# Patient Record
Sex: MALE | Race: ASIAN | NOT HISPANIC OR LATINO | Employment: STUDENT | URBAN - METROPOLITAN AREA
[De-identification: names, ages, dates, MRNs, and addresses within clinical notes are randomized per-mention and may not be internally consistent; named-entity substitution may affect disease eponyms.]

---
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
## 2019-02-21 ENCOUNTER — OFFICE VISIT (OUTPATIENT)
Dept: FAMILY MEDICINE CLINIC | Facility: CLINIC | Age: 19
End: 2019-02-21
Payer: COMMERCIAL

## 2019-02-21 VITALS
BODY MASS INDEX: 30.72 KG/M2 | DIASTOLIC BLOOD PRESSURE: 70 MMHG | TEMPERATURE: 98.7 F | HEART RATE: 105 BPM | HEIGHT: 72 IN | SYSTOLIC BLOOD PRESSURE: 130 MMHG | WEIGHT: 226.8 LBS

## 2019-02-21 DIAGNOSIS — E11.9 TYPE 2 DIABETES MELLITUS WITHOUT COMPLICATION, WITHOUT LONG-TERM CURRENT USE OF INSULIN (HCC): Primary | ICD-10-CM

## 2019-02-21 DIAGNOSIS — E66.9 OBESITY (BMI 30-39.9): ICD-10-CM

## 2019-02-21 DIAGNOSIS — E66.9 OBESITY (BMI 30.0-34.9): ICD-10-CM

## 2019-02-21 DIAGNOSIS — I10 ESSENTIAL HYPERTENSION: ICD-10-CM

## 2019-02-21 PROCEDURE — 3008F BODY MASS INDEX DOCD: CPT | Performed by: FAMILY MEDICINE

## 2019-02-21 PROCEDURE — 3075F SYST BP GE 130 - 139MM HG: CPT | Performed by: FAMILY MEDICINE

## 2019-02-21 PROCEDURE — 1036F TOBACCO NON-USER: CPT | Performed by: FAMILY MEDICINE

## 2019-02-21 PROCEDURE — 99214 OFFICE O/P EST MOD 30 MIN: CPT | Performed by: FAMILY MEDICINE

## 2019-02-21 PROCEDURE — 3078F DIAST BP <80 MM HG: CPT | Performed by: FAMILY MEDICINE

## 2019-02-21 NOTE — PROGRESS NOTES
Assessment/Plan:  Type 2 diabetes mellitus (HCC)  Lab Results   Component Value Date    HGBA1C 11 9 (H) 06/29/2018       No results for input(s): POCGLU in the last 72 hours  The patient is overdue for lab work and will go for the testing as ordered  He will try to be more consistent with taking his medication as ordered  He admits that he has been missing doses on occasion  He is going to get back on track with working on his diet and losing some weight  We will see him back in the office as scheduled  Blood Sugar Average: Last 72 hrs:      Essential hypertension  The patient's blood pressure stable on his current medication  We have made no changes today  He will continue with his healthy diet and exercise  We will see him back in the office as scheduled  Diagnoses and all orders for this visit:    Type 2 diabetes mellitus without complication, without long-term current use of insulin (HCC)  -     Comprehensive metabolic panel; Future  -     Hemoglobin A1C; Future  -     LDL cholesterol, direct; Future  -     Lipid panel; Future  -     Microalbumin / creatinine urine ratio; Future  -     TSH, 3rd generation with Free T4 reflex; Future  -     CBC and differential; Future    Essential hypertension  -     Comprehensive metabolic panel; Future  -     Hemoglobin A1C; Future  -     LDL cholesterol, direct; Future  -     Lipid panel; Future  -     Microalbumin / creatinine urine ratio; Future  -     TSH, 3rd generation with Free T4 reflex; Future  -     CBC and differential; Future    Obesity (BMI 30-39 9)  -     Comprehensive metabolic panel; Future  -     Hemoglobin A1C; Future  -     LDL cholesterol, direct; Future  -     Lipid panel; Future  -     Microalbumin / creatinine urine ratio; Future  -     TSH, 3rd generation with Free T4 reflex; Future  -     CBC and differential; Future    Obesity (BMI 30 0-34  9)       Return in about 1 month (around 3/21/2019) for Recheck       Subjective:   Chief Complaint   Patient presents with    Follow-up     Check up Diabetes  Patient ID: Manjula Oliveira is a 22 y o  male presents today for a routine checkup  Manjula Oliveira is a 22 y o  Male who presents today for a checkup of his type 2 diabetes, hypertension, and obesity  He did not have his lab work performed that was ordered at his last visit  He admits to missing his mediation at times- he has sydnee distracted with school and family issues  He has been getting BS in the 200 range before meals  There is no polyuria or polydipsia  The patient denies any chest pain, shortness of breath, or palpitations  There is no edema  There are no headaches or visual changes  There is no lightheadedness, dizziness, or fainting spells  The patient currently denies any nausea, vomiting, or GERD symptoms  he has normal bowel movements and normal urine output  he has a normal appetite  There is no numbness or tingling in his feet  He is eating vegetables and fruit  He has not been exercising  He is going to be scheduling an eye checkup  Diabetes   He presents for his follow-up diabetic visit  He has type 2 diabetes mellitus  His disease course has been worsening  There are no hypoglycemic associated symptoms  Pertinent negatives for hypoglycemia include no headaches or sweats  Pertinent negatives for diabetes include no blurred vision, no chest pain, no fatigue, no foot paresthesias, no foot ulcerations, no polydipsia, no polyphagia, no polyuria, no visual change, no weakness and no weight loss  There are no hypoglycemic complications  Symptoms are worsening  There are no diabetic complications  He has not had a previous visit with a dietitian  He rarely participates in exercise  An ACE inhibitor/angiotensin II receptor blocker is being taken  He does not see a podiatrist Eye exam is not current  Hypertension   This is a chronic problem  The current episode started more than 1 year ago   The problem is unchanged  The problem is controlled  Pertinent negatives include no anxiety, blurred vision, chest pain, headaches, malaise/fatigue, neck pain, orthopnea, palpitations, peripheral edema, PND, shortness of breath or sweats  Past treatments include ACE inhibitors  The current treatment provides significant improvement  There are no compliance problems  The following portions of the patient's history were reviewed and updated as appropriate: allergies, current medications, past family history, past medical history, past social history, past surgical history and problem list   Patient Active Problem List   Diagnosis    Essential hypertension    Obesity (BMI 30-39  9)    Type 2 diabetes mellitus ([facility] [address] )     Past Medical History:   Diagnosis Date    Fracture of navicular bone of left foot     WITH ROUTINE HEALING, SUBSEQUENT ENCOUNTER  LAST ASSESSED: 5/15/15     History reviewed  No pertinent surgical history    No Known Allergies  Family History   Problem Relation Age of Onset    Anemia Mother     Diabetes Maternal Grandmother     Diabetes Paternal Grandmother      Social History     Socioeconomic History    Marital status: Single     Spouse name: Not on file    Number of children: Not on file    Years of education: Not on file    Highest education level: Not on file   Occupational History    Not on file   Social Needs    Financial resource strain: Not on file    Food insecurity:     Worry: Not on file     Inability: Not on file    Transportation needs:     Medical: Not on file     Non-medical: Not on file   Tobacco Use    Smoking status: Never Smoker    Smokeless tobacco: Never Used   Substance and Sexual Activity    Alcohol use: Not on file    Drug use: Not on file    Sexual activity: Not on file   Lifestyle    Physical activity:     Days per week: Not on file     Minutes per session: Not on file    Stress: Not on file   Relationships    Social connections:     Talks on phone: Not on file Gets together: Not on file     Attends Bahai service: Not on file     Active member of club or organization: Not on file     Attends meetings of clubs or organizations: Not on file     Relationship status: Not on file    Intimate partner violence:     Fear of current or ex partner: Not on file     Emotionally abused: Not on file     Physically abused: Not on file     Forced sexual activity: Not on file   Other Topics Concern    Not on file   Social History Narrative    HIGH SCHOOL STUDENT     Current Outpatient Medications on File Prior to Visit   Medication Sig Dispense Refill    ACCU-CHEK FASTCLIX LANCETS MISC by Does not apply route      glucose blood (ACCU-CHEK SMARTVIEW) test strip by In Vitro route      lisinopril (ZESTRIL) 10 mg tablet Take 1 tablet (10 mg total) by mouth daily 90 tablet 1    metFORMIN (GLUCOPHAGE-XR) 500 mg 24 hr tablet Take 3 tablets daily 270 tablet 1     No current facility-administered medications on file prior to visit  Review of Systems   Constitutional: Negative  Negative for fatigue, malaise/fatigue and weight loss  HENT: Negative  Eyes: Negative  Negative for blurred vision  Respiratory: Negative  Negative for shortness of breath  Cardiovascular: Negative  Negative for chest pain, palpitations, orthopnea and PND  Gastrointestinal: Negative  Endocrine: Negative  Negative for polydipsia, polyphagia and polyuria  Genitourinary: Negative  Musculoskeletal: Negative  Negative for neck pain  Skin: Negative  Allergic/Immunologic: Negative  Neurological: Negative  Negative for weakness and headaches  Hematological: Negative  Psychiatric/Behavioral: Negative  Objective:  Vitals:    02/21/19 1415   BP: 130/70   BP Location: Left arm   Patient Position: Sitting   Cuff Size: Large   Pulse: 105   Temp: 98 7 °F (37 1 °C)   TempSrc: Tympanic   Weight: 103 kg (226 lb 12 8 oz)   Height: 6' (1 829 m)     Body mass index is 30 76 kg/m²    Wt Readings from Last 3 Encounters:   02/21/19 103 kg (226 lb 12 8 oz) (98 %, Z= 2 03)*   11/05/18 105 kg (231 lb 6 4 oz) (98 %, Z= 2 12)*   08/21/18 103 kg (228 lb) (98 %, Z= 2 08)*     * Growth percentiles are based on CDC (Boys, 2-20 Years) data  Temp Readings from Last 3 Encounters:   02/21/19 98 7 °F (37 1 °C) (Tympanic)   11/05/18 98 8 °F (37 1 °C) (Tympanic)   08/21/18 98 8 °F (37 1 °C) (Tympanic)     BP Readings from Last 3 Encounters:   02/21/19 130/70   11/05/18 130/78   08/21/18 140/80     Pulse Readings from Last 3 Encounters:   02/21/19 105   11/05/18 68   08/21/18 68        Physical Exam   Constitutional: He is oriented to person, place, and time  He appears well-developed and well-nourished  No distress  Eyes: Pupils are equal, round, and reactive to light  EOM are normal    Neck: Normal range of motion  Neck supple  No JVD present  No tracheal deviation present  No thyromegaly present  Cardiovascular: Normal rate, regular rhythm and normal heart sounds  Exam reveals no gallop and no friction rub  No murmur heard  Pulmonary/Chest: Effort normal and breath sounds normal  No stridor  No respiratory distress  He has no wheezes  He has no rales  He exhibits no tenderness  Abdominal: Soft  Bowel sounds are normal  He exhibits no distension and no mass  There is no tenderness  There is no rebound and no guarding  Musculoskeletal: Normal range of motion  Lymphadenopathy:     He has no cervical adenopathy  Neurological: He is alert and oriented to person, place, and time  He has normal reflexes  He displays normal reflexes  No cranial nerve deficit  He exhibits normal muscle tone  Coordination normal    Skin: Skin is warm and dry  No rash noted  He is not diaphoretic  No erythema  No pallor  Nursing note and vitals reviewed  No visits with results within 2 Month(s) from this visit     Latest known visit with results is:   Appointment on 06/29/2018   Component Date Value    WBC 06/29/2018 9 32     RBC 06/29/2018 6 05*    Hemoglobin 06/29/2018 15 8     Hematocrit 06/29/2018 48 6     MCV 06/29/2018 80*    MCH 06/29/2018 26 1*    MCHC 06/29/2018 32 5     RDW 06/29/2018 11 8     MPV 06/29/2018 9 6     Platelets 01/76/8922 332     nRBC 06/29/2018 0     Neutrophils Relative 06/29/2018 53     Immat GRANS % 06/29/2018 1     Lymphocytes Relative 06/29/2018 35     Monocytes Relative 06/29/2018 7     Eosinophils Relative 06/29/2018 3     Basophils Relative 06/29/2018 1     Neutrophils Absolute 06/29/2018 4 96     Immature Grans Absolute 06/29/2018 0 06     Lymphocytes Absolute 06/29/2018 3 27     Monocytes Absolute 06/29/2018 0 68     Eosinophils Absolute 06/29/2018 0 30     Basophils Absolute 06/29/2018 0 05     TSH 3RD GENERATON 06/29/2018 2 150     Sodium 06/29/2018 135*    Potassium 06/29/2018 3 6     Chloride 06/29/2018 97*    CO2 06/29/2018 28     ANION GAP 06/29/2018 10     BUN 06/29/2018 8     Creatinine 06/29/2018 0 82     Glucose, Fasting 06/29/2018 234*    Calcium 06/29/2018 9 0     AST 06/29/2018 19     ALT 06/29/2018 57     Alkaline Phosphatase 06/29/2018 62     Total Protein 06/29/2018 8 2     Albumin 06/29/2018 4 5     Total Bilirubin 06/29/2018 0 62     eGFR 06/29/2018 129     Hemoglobin A1C 06/29/2018 11 9*    EAG 06/29/2018 295     Cholesterol 06/29/2018 188     Triglycerides 06/29/2018 290*    HDL, Direct 06/29/2018 35*    LDL Calculated 06/29/2018 95     Creatinine, Ur 06/29/2018 464 0     Microalbum  ,U,Random 06/29/2018 49 9*    Microalb Creat Ratio 06/29/2018 11     LDL Direct 06/29/2018 119*      BMI Counseling: Body mass index is 30 76 kg/m²  Discussed the patient's BMI with him  The BMI is above average  BMI counseling and education was provided to the patient   Nutrition recommendations include reducing portion sizes, decreasing overall calorie intake, 3-5 servings of fruits/vegetables daily, consuming healthier snacks, moderation in carbohydrate intake, increasing intake of lean protein, reducing intake of saturated fat and trans fat and reducing intake of cholesterol  Exercise recommendations include exercising 3-5 times per week and strength training exercises

## 2019-02-21 NOTE — PATIENT INSTRUCTIONS
Basic Carbohydrate Counting   AMBULATORY CARE:   Carbohydrate counting  is a way to plan your meals by counting the amount of carbohydrate in foods  Carbohydrates are the sugars, starches, and fiber found in fruit, grains, vegetables, and milk products  Carbohydrates increase your blood sugar levels  Carbohydrate counting can help you eat the right amount of carbohydrate to keep your blood sugar levels under control  What you need to know about planning meals using carbohydrate counting:  · A dietitian or healthcare provider will help you develop a healthy meal plan that works best for you  You will be taught how much carbohydrate to eat or drink for each meal and snack  Your meal plan will be based on your age, weight, usual food intake, and physical activity level  If you have diabetes, it will also include your blood sugar levels and diabetes medicine  Once you know how much carbohydrate you should eat, you can decide what type of food you want to eat  · You will need to know what foods contain carbohydrate and how much they contain  Keep track of the amount of carbohydrate in meals and snacks in order to follow your meal plan  Do not avoid carbohydrates or skip meals  Your blood sugar may fall too low if you do not eat enough carbohydrate or you skip meals  Foods that contain carbohydrate:   · Breads:  Each serving of food listed below contains about 15 g of carbohydrate   ¨ 1 slice of bread (1 ounce) or 1 flour or corn tortilla (6 inch)    ¨ ½ of a hamburger bun or ¼ of a large bagel (about 1 ounce)    ¨ 1 pancake (about 4 inches across and ¼ inch thick)    · Cereals and grains:  Serving sizes of ready-to-eat cereals vary  Look at the serving size and the total carbohydrate amount listed on the food label  Each serving of food listed below contains about 15 g of carbohydrate       ¨ ¾ cup of dry, unsweetened, ready-to-eat cereal or ¼ cup of low-fat granola     ¨ ½ cup of oatmeal or other cooked cereal ¨ ? cup of cooked rice or pasta    · Starchy vegetables and beans:  Each serving of food listed below contains about 15 g of carbohydrate   ¨ ½ cup of corn, green peas, sweet potatoes, or mashed potatoes    ¨ ¼ of a large baked potato    ¨ ½ cup of beans, lentils, and peas (garbanzo, berger, kidney, white, split, black-eyed)    · Crackers and snacks:  Each serving of food listed below contains about 15 g of carbohydrate   ¨ 3 pratima cracker squares or 8 animal crackers     ¨ 6 saltine-type crackers    ¨ 3 cups of popcorn or ¾ ounce of pretzels, potato chips, or tortilla chips    · Fruit:  Each serving of food listed below contains about 15 g of carbohydrate   ¨ 1 small (4 ounce) piece of fresh fruit or ¾ to 1 cup of fresh fruit    ¨ ½ cup of canned or frozen fruit, packed in natural juice    ¨ ½ cup (4 ounces) of unsweetened fruit juice    ¨ 2 tablespoons of dried fruit    · Desserts or sugary foods:  Each serving of food listed below contains about 15 g of carbohydrate   ¨ 2-inch square unfrosted cake or brownie     ¨ 2 small cookies    ¨ ½ cup of ice cream, frozen yogurt, or nondairy frozen yogurt    ¨ ¼ cup of sherbet or sorbet    ¨ 1 tablespoon of regular syrup, jam, or jelly    ¨ 2 tablespoons of light syrup    · Milk and yogurt:  Foods from the milk group contain about 12 g of carbohydrate per serving  ¨ 1 cup of fat-free or low-fat milk    ¨ 1 cup of soy milk    ¨ ? cup of fat-free, yogurt sweetened with artificial sweetener    · Non-starchy vegetables:  Each serving contains about 5 g of carbohydrate   Three servings of non-starch vegetables count as 1 carbohydrate serving  ¨ ½ cup of cooked vegetables or 1 cup of raw vegetables  This includes beets, broccoli, cabbage, cauliflower, cucumber, mushrooms, tomatoes, and zucchini    ¨ ½ cup of vegetable juice  How to use carbohydrate counting to plan meals:   · Count carbohydrate amounts using serving sizes:      ¨ Pasta dinner example:   You plan to have pasta, tossed salad, and an 8-ounce glass of milk  Your healthcare provider tells you that you may have 4 carbohydrate servings for dinner  One carbohydrate serving of pasta is ? cup  One cup of pasta will equal 3 carbohydrate servings  An 8-ounce glass of milk will count as 1 carbohydrate serving  These amounts of food would equal 4 carbohydrate servings  One cup of tossed salad does not count toward your carbohydrate servings  · Count carbohydrate amounts using food labels:  Find the total amount of carbohydrate in a packaged food by reading the food label  Food labels tell you the serving size of the food and the total carbohydrate amount in each serving  Find the serving size on the food label and then decide how many servings you will eat  Multiply the number of servings you plan to eat by the carbohydrate amount per serving  ¨ Granola bar snack example: Your meal plan allows you to have 2 carbohydrate servings (30 grams) of carbohydrate for a snack  You plan to eat 1 package of granola bars, which contains 2 bars  According to the food label, the serving size of food in this package is 1 bar  Each serving (1 bar) contains 25 grams of carbohydrate  The total amount of carbohydrate in this package of granola bars would be 50 g  Based on your meal plan, you should eat only 1 bar  Follow up with your healthcare provider as directed:  Write down your questions so you remember to ask them during your visits  © 2017 [address] [employer] Information is for End User's use only and may not be sold, redistributed or otherwise used for commercial purposes  All illustrations and images included in CareNotes® are the copyrighted property of [employer]  or Ervin Kenny  The above information is an  only  It is not intended as medical advice for individual conditions or treatments   Talk to your doctor, nurse or pharmacist before following any medical regimen to see if it is safe and effective for you  Try to be more consistent with taking her medication regularly  Try send alarm on your phone to remember take her medications  Find a time that works for you to remember to take the medications  Start checking your fasting blood sugars daily  Check the blood sugar 1st thing in the morning before you eat anything and keep a record of your readings  Bring it to your next visit

## 2019-02-22 NOTE — ASSESSMENT & PLAN NOTE
Lab Results   Component Value Date    HGBA1C 11 9 (H) 06/29/2018       No results for input(s): POCGLU in the last 72 hours  The patient is overdue for lab work and will go for the testing as ordered  He will try to be more consistent with taking his medication as ordered  He admits that he has been missing doses on occasion  He is going to get back on track with working on his diet and losing some weight  We will see him back in the office as scheduled    Blood Sugar Average: Last 72 hrs:

## 2019-02-22 NOTE — ASSESSMENT & PLAN NOTE
The patient's blood pressure stable on his current medication  We have made no changes today  He will continue with his healthy diet and exercise  We will see him back in the office as scheduled

## 2019-02-28 ENCOUNTER — APPOINTMENT (OUTPATIENT)
Dept: LAB | Facility: CLINIC | Age: 19
End: 2019-02-28
Payer: COMMERCIAL

## 2019-02-28 DIAGNOSIS — E66.9 OBESITY (BMI 30-39.9): ICD-10-CM

## 2019-02-28 DIAGNOSIS — I10 ESSENTIAL HYPERTENSION: ICD-10-CM

## 2019-02-28 DIAGNOSIS — E11.9 TYPE 2 DIABETES MELLITUS WITHOUT COMPLICATION, WITHOUT LONG-TERM CURRENT USE OF INSULIN (HCC): ICD-10-CM

## 2019-02-28 LAB
ALBUMIN SERPL BCP-MCNC: 4.2 G/DL (ref 3.5–5)
ALP SERPL-CCNC: 51 U/L (ref 46–484)
ALT SERPL W P-5'-P-CCNC: 35 U/L (ref 12–78)
ANION GAP SERPL CALCULATED.3IONS-SCNC: 8 MMOL/L (ref 4–13)
AST SERPL W P-5'-P-CCNC: 12 U/L (ref 5–45)
BASOPHILS # BLD AUTO: 0.05 THOUSANDS/ΜL (ref 0–0.1)
BASOPHILS NFR BLD AUTO: 1 % (ref 0–1)
BILIRUB SERPL-MCNC: 0.6 MG/DL (ref 0.2–1)
BUN SERPL-MCNC: 10 MG/DL (ref 5–25)
CALCIUM SERPL-MCNC: 8.8 MG/DL (ref 8.3–10.1)
CHLORIDE SERPL-SCNC: 97 MMOL/L (ref 100–108)
CHOLEST SERPL-MCNC: 182 MG/DL (ref 50–200)
CO2 SERPL-SCNC: 27 MMOL/L (ref 21–32)
CREAT SERPL-MCNC: 0.82 MG/DL (ref 0.6–1.3)
CREAT UR-MCNC: 222 MG/DL
EOSINOPHIL # BLD AUTO: 0.2 THOUSAND/ΜL (ref 0–0.61)
EOSINOPHIL NFR BLD AUTO: 3 % (ref 0–6)
ERYTHROCYTE [DISTWIDTH] IN BLOOD BY AUTOMATED COUNT: 11.7 % (ref 11.6–15.1)
EST. AVERAGE GLUCOSE BLD GHB EST-MCNC: 283 MG/DL
GFR SERPL CREATININE-BSD FRML MDRD: 128 ML/MIN/1.73SQ M
GLUCOSE P FAST SERPL-MCNC: 206 MG/DL (ref 65–99)
HBA1C MFR BLD: 11.5 % (ref 4.2–6.3)
HCT VFR BLD AUTO: 48.1 % (ref 36.5–49.3)
HDLC SERPL-MCNC: 35 MG/DL (ref 40–60)
HGB BLD-MCNC: 15.9 G/DL (ref 12–17)
IMM GRANULOCYTES # BLD AUTO: 0.03 THOUSAND/UL (ref 0–0.2)
IMM GRANULOCYTES NFR BLD AUTO: 0 % (ref 0–2)
LDLC SERPL DIRECT ASSAY-MCNC: 97 MG/DL (ref 0–100)
LYMPHOCYTES # BLD AUTO: 3.06 THOUSANDS/ΜL (ref 0.6–4.47)
LYMPHOCYTES NFR BLD AUTO: 39 % (ref 14–44)
MCH RBC QN AUTO: 26.2 PG (ref 26.8–34.3)
MCHC RBC AUTO-ENTMCNC: 33.1 G/DL (ref 31.4–37.4)
MCV RBC AUTO: 79 FL (ref 82–98)
MICROALBUMIN UR-MCNC: 111 MG/L (ref 0–20)
MICROALBUMIN/CREAT 24H UR: 50 MG/G CREATININE (ref 0–30)
MONOCYTES # BLD AUTO: 0.51 THOUSAND/ΜL (ref 0.17–1.22)
MONOCYTES NFR BLD AUTO: 7 % (ref 4–12)
NEUTROPHILS # BLD AUTO: 3.95 THOUSANDS/ΜL (ref 1.85–7.62)
NEUTS SEG NFR BLD AUTO: 50 % (ref 43–75)
NONHDLC SERPL-MCNC: 147 MG/DL
NRBC BLD AUTO-RTO: 0 /100 WBCS
PLATELET # BLD AUTO: 308 THOUSANDS/UL (ref 149–390)
PMV BLD AUTO: 9.3 FL (ref 8.9–12.7)
POTASSIUM SERPL-SCNC: 3.7 MMOL/L (ref 3.5–5.3)
PROT SERPL-MCNC: 7.6 G/DL (ref 6.4–8.2)
RBC # BLD AUTO: 6.06 MILLION/UL (ref 3.88–5.62)
SODIUM SERPL-SCNC: 132 MMOL/L (ref 136–145)
TRIGL SERPL-MCNC: 544 MG/DL
TSH SERPL DL<=0.05 MIU/L-ACNC: 1.82 UIU/ML (ref 0.46–3.98)
WBC # BLD AUTO: 7.8 THOUSAND/UL (ref 4.31–10.16)

## 2019-02-28 PROCEDURE — 82043 UR ALBUMIN QUANTITATIVE: CPT

## 2019-02-28 PROCEDURE — 83036 HEMOGLOBIN GLYCOSYLATED A1C: CPT

## 2019-02-28 PROCEDURE — 36415 COLL VENOUS BLD VENIPUNCTURE: CPT

## 2019-02-28 PROCEDURE — 84443 ASSAY THYROID STIM HORMONE: CPT

## 2019-02-28 PROCEDURE — 82570 ASSAY OF URINE CREATININE: CPT

## 2019-02-28 PROCEDURE — 3060F POS MICROALBUMINURIA REV: CPT | Performed by: FAMILY MEDICINE

## 2019-02-28 PROCEDURE — 85025 COMPLETE CBC W/AUTO DIFF WBC: CPT

## 2019-02-28 PROCEDURE — 80061 LIPID PANEL: CPT

## 2019-02-28 PROCEDURE — 83721 ASSAY OF BLOOD LIPOPROTEIN: CPT

## 2019-02-28 PROCEDURE — 80053 COMPREHEN METABOLIC PANEL: CPT

## 2019-03-26 ENCOUNTER — OFFICE VISIT (OUTPATIENT)
Dept: FAMILY MEDICINE CLINIC | Facility: CLINIC | Age: 19
End: 2019-03-26
Payer: COMMERCIAL

## 2019-03-26 VITALS
BODY MASS INDEX: 30.75 KG/M2 | HEIGHT: 72 IN | TEMPERATURE: 97.5 F | WEIGHT: 227 LBS | SYSTOLIC BLOOD PRESSURE: 98 MMHG | OXYGEN SATURATION: 98 % | DIASTOLIC BLOOD PRESSURE: 70 MMHG | HEART RATE: 112 BPM

## 2019-03-26 DIAGNOSIS — E66.9 OBESITY (BMI 30-39.9): ICD-10-CM

## 2019-03-26 DIAGNOSIS — I10 ESSENTIAL HYPERTENSION: ICD-10-CM

## 2019-03-26 DIAGNOSIS — Z23 NEED FOR VACCINATION: ICD-10-CM

## 2019-03-26 DIAGNOSIS — E11.9 TYPE 2 DIABETES MELLITUS WITHOUT COMPLICATION, WITHOUT LONG-TERM CURRENT USE OF INSULIN (HCC): Primary | ICD-10-CM

## 2019-03-26 PROCEDURE — 1036F TOBACCO NON-USER: CPT | Performed by: FAMILY MEDICINE

## 2019-03-26 PROCEDURE — 90471 IMMUNIZATION ADMIN: CPT | Performed by: FAMILY MEDICINE

## 2019-03-26 PROCEDURE — 99214 OFFICE O/P EST MOD 30 MIN: CPT | Performed by: FAMILY MEDICINE

## 2019-03-26 PROCEDURE — 3008F BODY MASS INDEX DOCD: CPT | Performed by: FAMILY MEDICINE

## 2019-03-26 PROCEDURE — 3074F SYST BP LT 130 MM HG: CPT | Performed by: FAMILY MEDICINE

## 2019-03-26 PROCEDURE — 90732 PPSV23 VACC 2 YRS+ SUBQ/IM: CPT | Performed by: FAMILY MEDICINE

## 2019-03-26 PROCEDURE — 3078F DIAST BP <80 MM HG: CPT | Performed by: FAMILY MEDICINE

## 2019-03-26 RX ORDER — GLIMEPIRIDE 2 MG/1
2 TABLET ORAL
Qty: 30 TABLET | Refills: 6 | Status: SHIPPED | OUTPATIENT
Start: 2019-03-26 | End: 2019-11-13 | Stop reason: SDUPTHER

## 2019-03-26 NOTE — PROGRESS NOTES
Assessment/Plan:  Type 2 diabetes mellitus (HCC)  Lab Results   Component Value Date    HGBA1C 11 5 (H) 02/28/2019       No results for input(s): POCGLU in the last 72 hours  The patient's diabetes is not controlled on his current medication  He is trying to improve his diet and increase his exercise  We are going to add on the glimepiride 2 mg daily to better control his blood sugars  We will see him back in the office again in 2 months and he will get the lab work done prior to that visit  I also recommended the Pneumovax pneumonia vaccine for him since he is at high risk because of his diabetes  Blood Sugar Average: Last 72 hrs:      Essential hypertension  The patient's blood pressure is controlled with his current dose of lisinopril  We will maintain him on his current medication  He will continue to work on improving his diet and exercise and on losing weight  We will see him back as scheduled  Diagnoses and all orders for this visit:    Type 2 diabetes mellitus without complication, without long-term current use of insulin (HCC)  -     PNEUMOCOCCAL POLYSACCHARIDE VACCINE 23-VALENT =>3YO SQ IM  -     Comprehensive metabolic panel; Future  -     Hemoglobin A1C; Future  -     LDL cholesterol, direct; Future  -     Lipid panel; Future  -     Microalbumin / creatinine urine ratio; Future  -     TSH, 3rd generation with Free T4 reflex; Future  -     CBC and differential; Future  -     glimepiride (AMARYL) 2 mg tablet; Take 1 tablet (2 mg total) by mouth daily with breakfast    Essential hypertension  -     Comprehensive metabolic panel; Future  -     Hemoglobin A1C; Future  -     LDL cholesterol, direct; Future  -     Lipid panel; Future  -     Microalbumin / creatinine urine ratio; Future  -     TSH, 3rd generation with Free T4 reflex; Future  -     CBC and differential; Future    Obesity (BMI 30-39 9)  -     Comprehensive metabolic panel;  Future  -     Hemoglobin A1C; Future  -     LDL cholesterol, direct; Future  -     Lipid panel; Future  -     Microalbumin / creatinine urine ratio; Future  -     TSH, 3rd generation with Free T4 reflex; Future  -     CBC and differential; Future    Need for vaccination  -     PNEUMOCOCCAL POLYSACCHARIDE VACCINE 23-VALENT =>3YO SQ IM       Return in about 2 months (around 6/10/2019) for Recheck  Subjective:   Chief Complaint   Patient presents with    Follow-up     1 month check up- to discuss blood work         Patient ID: Jay Cobb is a 22 y o  male presents today for a routine follow-up  Jay Cobb is a 22 y o  Male who presents today for follow-up of his type 2 diabetes and hypertension and to review his recent blood work  At his last visit, he admitted to frequently missing doses of his medication due to recent family stressors  He also admitted to a poor diet  His hemoglobin A1c on his current blood work is poor at 11 5 indicating poor control of his diabetes  He is taking the medication every day since his last visit  He has not checked his BS in 2 weeks, but it was high- in the 200's a a week ago  There is no polyuria, polyphagia, or polydipsia  There is no change in his vision  The patient denies any chest pain, shortness of breath, or palpitations  There is no edema  There are no headaches or visual changes  There is no lightheadedness, dizziness, or fainting spells  There is no numbness or tingling in his feet  He needs to schedule an appointment with the eye doctor  The patient currently denies any nausea, vomiting, or GERD symptoms  he has normal bowel movements and normal urine output  he has a normal appetite  He denies any lightheadedness, dizziness, or fainting spells  Diabetes   He presents for his follow-up diabetic visit  He has type 2 diabetes mellitus  His disease course has been worsening  There are no hypoglycemic associated symptoms  Pertinent negatives for hypoglycemia include no headaches or sweats   Pertinent negatives for diabetes include no blurred vision, no chest pain, no fatigue, no foot paresthesias, no foot ulcerations, no polydipsia, no polyphagia, no polyuria, no visual change, no weakness and no weight loss  There are no hypoglycemic complications  Symptoms are worsening  There are no diabetic complications  Risk factors for coronary artery disease include diabetes mellitus, dyslipidemia, hypertension and male sex  Current diabetic treatment includes oral agent (monotherapy)  He is compliant with treatment all of the time  His weight is stable  Meal planning includes avoidance of concentrated sweets  He has not had a previous visit with a dietitian  He rarely participates in exercise  His home blood glucose trend is increasing steadily  An ACE inhibitor/angiotensin II receptor blocker is being taken  He does not see a podiatrist Eye exam is not current  Hypertension   This is a chronic problem  The current episode started more than 1 year ago  The problem is unchanged  The problem is controlled  Pertinent negatives include no anxiety, blurred vision, chest pain, headaches, malaise/fatigue, neck pain, orthopnea, palpitations, peripheral edema, PND, shortness of breath or sweats  Risk factors for coronary artery disease include diabetes mellitus, male gender and obesity  Past treatments include ACE inhibitors  The current treatment provides significant improvement  There are no compliance problems  The following portions of the patient's history were reviewed and updated as appropriate: allergies, current medications, past family history, past medical history, past social history, past surgical history and problem list   Patient Active Problem List   Diagnosis    Essential hypertension    Obesity (BMI 30-39  9)    Type 2 diabetes mellitus ([address] )     Past Medical History:   Diagnosis Date    Fracture of navicular bone of left foot     WITH ROUTINE HEALING, SUBSEQUENT ENCOUNTER   LAST ASSESSED: 5/15/15 No past surgical history on file    No Known Allergies  Family History   Problem Relation Age of Onset    Anemia Mother     Diabetes Maternal Grandmother     Diabetes Paternal Grandmother      Social History     Socioeconomic History    Marital status: Single     Spouse name: Not on file    Number of children: Not on file    Years of education: Not on file    Highest education level: Not on file   Occupational History    Not on file   Social Needs    Financial resource strain: Not on file    Food insecurity:     Worry: Not on file     Inability: Not on file    Transportation needs:     Medical: Not on file     Non-medical: Not on file   Tobacco Use    Smoking status: Never Smoker    Smokeless tobacco: Never Used   Substance and Sexual Activity    Alcohol use: Not on file    Drug use: Not on file    Sexual activity: Not on file   Lifestyle    Physical activity:     Days per week: Not on file     Minutes per session: Not on file    Stress: Not on file   Relationships    Social connections:     Talks on phone: Not on file     Gets together: Not on file     Attends Denominational service: Not on file     Active member of club or organization: Not on file     Attends meetings of clubs or organizations: Not on file     Relationship status: Not on file    Intimate partner violence:     Fear of current or ex partner: Not on file     Emotionally abused: Not on file     Physically abused: Not on file     Forced sexual activity: Not on file   Other Topics Concern    Not on file   Social History Narrative    HIGH SCHOOL STUDENT     Current Outpatient Medications on File Prior to Visit   Medication Sig Dispense Refill    ACCU-CHEK FASTCLIX LANCETS MISC by Does not apply route      glucose blood (ACCU-CHEK SMARTVIEW) test strip by In Vitro route      lisinopril (ZESTRIL) 10 mg tablet Take 1 tablet (10 mg total) by mouth daily 90 tablet 1    metFORMIN (GLUCOPHAGE-XR) 500 mg 24 hr tablet Take 3 tablets daily 270 tablet 1     No current facility-administered medications on file prior to visit  Review of Systems   Constitutional: Negative  Negative for fatigue, malaise/fatigue and weight loss  HENT: Negative  Eyes: Negative  Negative for blurred vision  Respiratory: Negative  Negative for shortness of breath  Cardiovascular: Negative  Negative for chest pain, palpitations, orthopnea and PND  Gastrointestinal: Negative  Endocrine: Negative  Negative for polydipsia, polyphagia and polyuria  Genitourinary: Negative  Musculoskeletal: Negative  Negative for neck pain  Skin: Negative  Allergic/Immunologic: Negative  Neurological: Negative  Negative for weakness and headaches  Hematological: Negative  Psychiatric/Behavioral: Negative  Objective:  Vitals:    03/26/19 1327   BP: 98/70   BP Location: Left arm   Patient Position: Sitting   Cuff Size: Large   Pulse: (!) 112   Temp: 97 5 °F (36 4 °C)   TempSrc: Tympanic   SpO2: 98%   Weight: 103 kg (227 lb)   Height: 6' (1 829 m)     Body mass index is 30 79 kg/m²  Wt Readings from Last 3 Encounters:   03/26/19 103 kg (227 lb) (98 %, Z= 2 03)*   02/21/19 103 kg (226 lb 12 8 oz) (98 %, Z= 2 03)*   11/05/18 105 kg (231 lb 6 4 oz) (98 %, Z= 2 12)*     * Growth percentiles are based on CDC (Boys, 2-20 Years) data  Temp Readings from Last 3 Encounters:   03/26/19 97 5 °F (36 4 °C) (Tympanic)   02/21/19 98 7 °F (37 1 °C) (Tympanic)   11/05/18 98 8 °F (37 1 °C) (Tympanic)     BP Readings from Last 3 Encounters:   03/26/19 98/70   02/21/19 130/70   11/05/18 130/78     Pulse Readings from Last 3 Encounters:   03/26/19 (!) 112   02/21/19 105   11/05/18 68        Physical Exam   Constitutional: He is oriented to person, place, and time  He appears well-developed and well-nourished  No distress  Eyes: Pupils are equal, round, and reactive to light  EOM are normal    Neck: Normal range of motion  Neck supple  No JVD present   No tracheal deviation present  No thyromegaly present  Cardiovascular: Normal rate, regular rhythm and normal heart sounds  Exam reveals no gallop and no friction rub  No murmur heard  Pulmonary/Chest: Effort normal and breath sounds normal  No stridor  No respiratory distress  He has no wheezes  He has no rales  He exhibits no tenderness  Abdominal: Soft  Bowel sounds are normal  He exhibits no distension and no mass  There is no tenderness  There is no rebound and no guarding  Musculoskeletal: Normal range of motion  Lymphadenopathy:     He has no cervical adenopathy  Neurological: He is alert and oriented to person, place, and time  He has normal reflexes  He displays normal reflexes  No cranial nerve deficit  He exhibits normal muscle tone  Coordination normal    Skin: Skin is warm and dry  No rash noted  He is not diaphoretic  No erythema  No pallor  Nursing note and vitals reviewed        Appointment on 02/28/2019   Component Date Value    Sodium 02/28/2019 132*    Potassium 02/28/2019 3 7     Chloride 02/28/2019 97*    CO2 02/28/2019 27     ANION GAP 02/28/2019 8     BUN 02/28/2019 10     Creatinine 02/28/2019 0 82     Glucose, Fasting 02/28/2019 206*    Calcium 02/28/2019 8 8     AST 02/28/2019 12     ALT 02/28/2019 35     Alkaline Phosphatase 02/28/2019 51     Total Protein 02/28/2019 7 6     Albumin 02/28/2019 4 2     Total Bilirubin 02/28/2019 0 60     eGFR 02/28/2019 128     Hemoglobin A1C 02/28/2019 11 5*    EAG 02/28/2019 283     LDL Direct 02/28/2019 97     Cholesterol 02/28/2019 182     Triglycerides 02/28/2019 544*    HDL, Direct 02/28/2019 35*    LDL Calculated 02/28/2019      Non-HDL-Chol (CHOL-HDL) 02/28/2019 147     TSH 3RD GENERATON 02/28/2019 1 820     WBC 02/28/2019 7 80     RBC 02/28/2019 6 06*    Hemoglobin 02/28/2019 15 9     Hematocrit 02/28/2019 48 1     MCV 02/28/2019 79*    MCH 02/28/2019 26 2*    MCHC 02/28/2019 33 1     RDW 02/28/2019 11 7     MPV 02/28/2019 9 3     Platelets 90/61/1181 308     nRBC 02/28/2019 0     Neutrophils Relative 02/28/2019 50     Immat GRANS % 02/28/2019 0     Lymphocytes Relative 02/28/2019 39     Monocytes Relative 02/28/2019 7     Eosinophils Relative 02/28/2019 3     Basophils Relative 02/28/2019 1     Neutrophils Absolute 02/28/2019 3 95     Immature Grans Absolute 02/28/2019 0 03     Lymphocytes Absolute 02/28/2019 3 06     Monocytes Absolute 02/28/2019 0 51     Eosinophils Absolute 02/28/2019 0 20     Basophils Absolute 02/28/2019 0 05     Creatinine, Ur 02/28/2019 222 0     Microalbum  ,U,Random 02/28/2019 111 0*    Microalb Creat Ratio 02/28/2019 50*

## 2019-03-27 NOTE — ASSESSMENT & PLAN NOTE
Lab Results   Component Value Date    HGBA1C 11 5 (H) 02/28/2019       No results for input(s): POCGLU in the last 72 hours  The patient's diabetes is not controlled on his current medication  He is trying to improve his diet and increase his exercise  We are going to add on the glimepiride 2 mg daily to better control his blood sugars  We will see him back in the office again in 2 months and he will get the lab work done prior to that visit  I also recommended the Pneumovax pneumonia vaccine for him since he is at high risk because of his diabetes    Blood Sugar Average: Last 72 hrs:

## 2019-03-27 NOTE — ASSESSMENT & PLAN NOTE
The patient's blood pressure is controlled with his current dose of lisinopril  We will maintain him on his current medication  He will continue to work on improving his diet and exercise and on losing weight  We will see him back as scheduled

## 2019-08-15 DIAGNOSIS — E11.9 TYPE 2 DIABETES MELLITUS WITHOUT COMPLICATION, WITHOUT LONG-TERM CURRENT USE OF INSULIN (HCC): ICD-10-CM

## 2019-08-15 RX ORDER — METFORMIN HYDROCHLORIDE 500 MG/1
TABLET, EXTENDED RELEASE ORAL
Qty: 270 TABLET | Refills: 1 | OUTPATIENT
Start: 2019-08-15

## 2019-08-19 DIAGNOSIS — E11.9 TYPE 2 DIABETES MELLITUS WITHOUT COMPLICATION, WITHOUT LONG-TERM CURRENT USE OF INSULIN (HCC): ICD-10-CM

## 2019-08-19 RX ORDER — METFORMIN HYDROCHLORIDE 500 MG/1
TABLET, EXTENDED RELEASE ORAL
Qty: 270 TABLET | Refills: 1 | OUTPATIENT
Start: 2019-08-19

## 2019-09-11 LAB
LEFT EYE DIABETIC RETINOPATHY: NORMAL
RIGHT EYE DIABETIC RETINOPATHY: NORMAL

## 2019-10-02 DIAGNOSIS — E11.9 TYPE 2 DIABETES MELLITUS WITHOUT COMPLICATION, WITHOUT LONG-TERM CURRENT USE OF INSULIN (HCC): ICD-10-CM

## 2019-10-02 RX ORDER — METFORMIN HYDROCHLORIDE 500 MG/1
TABLET, EXTENDED RELEASE ORAL
Qty: 90 TABLET | Refills: 0 | Status: SHIPPED | OUTPATIENT
Start: 2019-10-02 | End: 2019-11-13 | Stop reason: SDUPTHER

## 2019-10-24 DIAGNOSIS — E11.9 TYPE 2 DIABETES MELLITUS WITHOUT COMPLICATION, WITHOUT LONG-TERM CURRENT USE OF INSULIN (HCC): ICD-10-CM

## 2019-10-24 RX ORDER — METFORMIN HYDROCHLORIDE 500 MG/1
TABLET, EXTENDED RELEASE ORAL
Qty: 90 TABLET | Refills: 0 | OUTPATIENT
Start: 2019-10-24

## 2019-11-13 ENCOUNTER — OFFICE VISIT (OUTPATIENT)
Dept: FAMILY MEDICINE CLINIC | Facility: CLINIC | Age: 19
End: 2019-11-13
Payer: COMMERCIAL

## 2019-11-13 VITALS
HEIGHT: 72 IN | BODY MASS INDEX: 31.29 KG/M2 | DIASTOLIC BLOOD PRESSURE: 90 MMHG | HEART RATE: 80 BPM | SYSTOLIC BLOOD PRESSURE: 120 MMHG | RESPIRATION RATE: 14 BRPM | TEMPERATURE: 97.1 F | WEIGHT: 231 LBS

## 2019-11-13 DIAGNOSIS — E66.9 OBESITY (BMI 30-39.9): ICD-10-CM

## 2019-11-13 DIAGNOSIS — Z23 NEED FOR INFLUENZA VACCINATION: ICD-10-CM

## 2019-11-13 DIAGNOSIS — I10 ESSENTIAL HYPERTENSION: ICD-10-CM

## 2019-11-13 DIAGNOSIS — Z11.4 SCREENING FOR HIV (HUMAN IMMUNODEFICIENCY VIRUS): ICD-10-CM

## 2019-11-13 DIAGNOSIS — E11.9 TYPE 2 DIABETES MELLITUS WITHOUT COMPLICATION, WITHOUT LONG-TERM CURRENT USE OF INSULIN (HCC): Primary | ICD-10-CM

## 2019-11-13 DIAGNOSIS — F32.A DEPRESSION, UNSPECIFIED DEPRESSION TYPE: ICD-10-CM

## 2019-11-13 LAB — SL AMB POCT HEMOGLOBIN AIC: 9.6 (ref ?–6.5)

## 2019-11-13 PROCEDURE — 3046F HEMOGLOBIN A1C LEVEL >9.0%: CPT | Performed by: FAMILY MEDICINE

## 2019-11-13 PROCEDURE — 90471 IMMUNIZATION ADMIN: CPT | Performed by: FAMILY MEDICINE

## 2019-11-13 PROCEDURE — 4010F ACE/ARB THERAPY RXD/TAKEN: CPT | Performed by: FAMILY MEDICINE

## 2019-11-13 PROCEDURE — 90682 RIV4 VACC RECOMBINANT DNA IM: CPT | Performed by: FAMILY MEDICINE

## 2019-11-13 PROCEDURE — 83036 HEMOGLOBIN GLYCOSYLATED A1C: CPT | Performed by: FAMILY MEDICINE

## 2019-11-13 PROCEDURE — 99214 OFFICE O/P EST MOD 30 MIN: CPT | Performed by: FAMILY MEDICINE

## 2019-11-13 RX ORDER — ESCITALOPRAM OXALATE 10 MG/1
10 TABLET ORAL DAILY
Qty: 30 TABLET | Refills: 5 | Status: SHIPPED | OUTPATIENT
Start: 2019-11-13 | End: 2020-05-01

## 2019-11-13 RX ORDER — LISINOPRIL 5 MG/1
5 TABLET ORAL DAILY
Qty: 30 TABLET | Refills: 5 | Status: SHIPPED | OUTPATIENT
Start: 2019-11-13 | End: 2020-05-01

## 2019-11-13 RX ORDER — GLIMEPIRIDE 2 MG/1
2 TABLET ORAL
Qty: 30 TABLET | Refills: 5 | Status: SHIPPED | OUTPATIENT
Start: 2019-11-13 | End: 2021-05-18 | Stop reason: SDUPTHER

## 2019-11-13 RX ORDER — METFORMIN HYDROCHLORIDE 500 MG/1
TABLET, EXTENDED RELEASE ORAL
Qty: 30 TABLET | Refills: 5 | Status: SHIPPED | OUTPATIENT
Start: 2019-11-13 | End: 2020-12-04 | Stop reason: SDUPTHER

## 2019-11-13 RX ORDER — LANCETS
EACH MISCELLANEOUS
Qty: 100 EACH | Refills: 2 | Status: SHIPPED | OUTPATIENT
Start: 2019-11-13 | End: 2020-04-07 | Stop reason: SDUPTHER

## 2019-11-13 NOTE — PROGRESS NOTES
Assessment/Plan:  Type 2 diabetes mellitus ([facility] [address] )    Lab Results   Component Value Date    HGBA1C 9 6 (A) 11/13/2019   The patient's diabetes is under poor control  His hemoglobin A1c today in the office was markedly elevated at 9 6  He has been out of his glimepiride 4 over a month  We will restart him back on his medication  He is going to get back on track with working on his diet and exercise  He will start monitoring his blood sugars at home once a day and I have given him a new prescription for test strips and lancets  We will will go for the lab work at his earliest convenience and will follow up with him as scheduled  Essential hypertension  The patient's blood pressure was not bad today in the office  He has actually been off of his lisinopril for over a month  We will restart the lisinopril but at the 5 mg dose and will monitor him closely  Depression  The patient has worsening depression  We are going to start him on the escitalopram 10 mg daily as ordered  He is going to look into starting some counseling as well  We will monitor him very closely  He will call with any worsening symptoms  We will see him back in the office again in a month  Depression Screening Follow-up Plan: Patient's depression screening was positive with a PHQ-2 score of 4  Their PHQ-9 score was 7  Patient assessed for underlying major depression  They have no active suicidal ideations  Brief counseling provided and recommend additional follow-up/re-evaluation next office visit  Obesity (BMI 30-39  9)  I advised him to start weighing himself daily to track his weight  The patient was advised to start eating 7 non starchy vegetables and 3 fruits every day as well as 10-12 nuts per day  He was also advised to add on psyllium fiber 1 tbsp twice a day for goal of 13 g per day  He was advised to drink 16 oz of water before all meals and to avoid any artificially sweetened drinks    He was also advised to try high intensity interval training for 4 minutes per day along with resistance exercises  I also advised him to keep a food diary to track everything that he is eating in the course of the day  At meals, he should always cut his dish in  half and eat half  his meal and then determine if he is still hungry prior to eating the additional half  We will see him back in the office as scheduled  Diagnoses and all orders for this visit:    Type 2 diabetes mellitus without complication, without long-term current use of insulin (HCC)  -     POCT hemoglobin A1c  -     Comprehensive metabolic panel; Future  -     Hemoglobin A1C; Future  -     LDL cholesterol, direct; Future  -     Lipid panel; Future  -     Microalbumin / creatinine urine ratio; Future  -     TSH, 3rd generation with Free T4 reflex; Future  -     CBC and differential; Future  -     metFORMIN (GLUCOPHAGE-XR) 500 mg 24 hr tablet; Take 3 tablets daily  -     glimepiride (AMARYL) 2 mg tablet; Take 1 tablet (2 mg total) by mouth daily with breakfast  -     glucose blood (ONE TOUCH ULTRA TEST) test strip; Type 2 DM no insulin tests once daily  -     Lancets (ONETOUCH ULTRASOFT) lancets; Type 2 DM no insulin tests once daily    Essential hypertension  -     Comprehensive metabolic panel; Future  -     Hemoglobin A1C; Future  -     LDL cholesterol, direct; Future  -     Lipid panel; Future  -     Microalbumin / creatinine urine ratio; Future  -     TSH, 3rd generation with Free T4 reflex; Future  -     CBC and differential; Future  -     lisinopril (ZESTRIL) 5 mg tablet; Take 1 tablet (5 mg total) by mouth daily    Depression, unspecified depression type  -     escitalopram (LEXAPRO) 10 mg tablet; Take 1 tablet (10 mg total) by mouth daily    Screening for HIV (human immunodeficiency virus)  -     HIV 1/2 AG-AB combo;  Future    Need for influenza vaccination  -     influenza vaccine, [id number], quadrivalent, recombinant, PF, 0 5 mL, for patients 18 yr+ (FLUBLOK)    Obesity (BMI 30-39  9)       Return in about 1 month (around 12/13/2019) for Recheck  Subjective:   Chief Complaint   Patient presents with    Follow-up     DM check up visit     Depression     positive depression screening         Patient ID: Ellie Hendricks is a 22 y o  male presents today for a routine checkup  Ellie Hendricks is a 22 y o  male presents today for checkup of his type 2 diabetes and hypertension  His point of care is 9 6 today  He reports being depressed for a few months  He has had some family tragedies  He is sad all the time and he is anxious about school  He feel overwhelmed at times  There is a loss of interest   There are no suicidal ideations  There are no sleep problems  His appetite is normal     He feels blah and hopeless  He is interested in treatment  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  He ran out of his glimepiride- a few moths ago  He is taking the metformin  He ran out of the BP medication a few months ago  The patient denies any chest pain, shortness of breath, or palpitations  There is no edema  There are no headaches or visual changes  There is no lightheadedness, dizziness, or fainting spells  He has not been exercising as much and is watching his diet  The patient currently denies any nausea, vomiting, or GERD symptoms  he has normal bowel movements and normal urine output  he has a normal appetite  There is slight diarrhea  Depression   This is a new problem  The current episode started more than 1 month ago  The problem occurs constantly  The problem has been gradually worsening  Pertinent negatives include no abdominal pain, anorexia, arthralgias, change in bowel habit, chest pain, chills, congestion, coughing, diaphoresis, fatigue, fever, headaches, joint swelling, myalgias, nausea, neck pain, numbness, rash, sore throat, swollen glands, urinary symptoms, vertigo, visual change, vomiting or weakness     Diabetes   He presents for his follow-up diabetic visit  He has type 2 diabetes mellitus  His disease course has been fluctuating  Pertinent negatives for hypoglycemia include no headaches  Pertinent negatives for diabetes include no blurred vision, no chest pain, no fatigue, no foot paresthesias, no foot ulcerations, no polydipsia, no polyphagia, no polyuria, no visual change, no weakness and no weight loss  The following portions of the patient's history were reviewed and updated as appropriate: allergies, current medications, past family history, past medical history, past social history, past surgical history and problem list   Patient Active Problem List   Diagnosis    Essential hypertension    Obesity (BMI 30-39  9)    Type 2 diabetes mellitus ([facility] [address] )    Depression     Past Medical History:   Diagnosis Date    Fracture of navicular bone of left foot     WITH ROUTINE HEALING, SUBSEQUENT ENCOUNTER  LAST ASSESSED: 5/15/15     History reviewed  No pertinent surgical history    No Known Allergies  Family History   Problem Relation Age of Onset    Anemia Mother     Diabetes Maternal Grandmother     Diabetes Paternal Grandmother      Social History     Socioeconomic History    Marital status: Single     Spouse name: Not on file    Number of children: Not on file    Years of education: Not on file    Highest education level: Not on file   Occupational History    Not on file   Social Needs    Financial resource strain: Not on file    Food insecurity:     Worry: Not on file     Inability: Not on file    Transportation needs:     Medical: Not on file     Non-medical: Not on file   Tobacco Use    Smoking status: Never Smoker    Smokeless tobacco: Never Used   Substance and Sexual Activity    Alcohol use: Never     Frequency: Never    Drug use: Never    Sexual activity: Never   Lifestyle    Physical activity:     Days per week: Not on file     Minutes per session: Not on file    Stress: Not on file   Relationships  Social connections:     Talks on phone: Not on file     Gets together: Not on file     Attends Restorationist service: Not on file     Active member of club or organization: Not on file     Attends meetings of clubs or organizations: Not on file     Relationship status: Not on file    Intimate partner violence:     Fear of current or ex partner: Not on file     Emotionally abused: Not on file     Physically abused: Not on file     Forced sexual activity: Not on file   Other Topics Concern    Not on file   Social History Narrative    HIGH SCHOOL STUDENT     No current outpatient medications on file prior to visit  No current facility-administered medications on file prior to visit  Review of Systems   Constitutional: Negative  Negative for chills, diaphoresis, fatigue, fever and weight loss  HENT: Negative  Negative for congestion and sore throat  Eyes: Negative  Negative for blurred vision  Respiratory: Negative  Negative for cough  Cardiovascular: Negative  Negative for chest pain  Gastrointestinal: Negative  Negative for abdominal pain, anorexia, change in bowel habit, nausea and vomiting  Endocrine: Negative  Negative for polydipsia, polyphagia and polyuria  Genitourinary: Negative  Musculoskeletal: Negative  Negative for arthralgias, joint swelling, myalgias and neck pain  Skin: Negative  Negative for rash  Allergic/Immunologic: Negative  Neurological: Negative  Negative for vertigo, weakness, numbness and headaches  Hematological: Negative  Psychiatric/Behavioral: Positive for depression  Objective:  Vitals:    11/13/19 0941 11/13/19 1016   BP: 120/70 120/90   BP Location: Left arm    Patient Position: Sitting    Cuff Size: Large    Pulse: 102 80   Resp: 14    Temp: (!) 97 1 °F (36 2 °C)    TempSrc: Tympanic    Weight: 105 kg (231 lb)    Height: 6' (1 829 m)      Body mass index is 31 33 kg/m²       Physical Exam   Constitutional: He is oriented to person, place, and time  He appears well-developed and well-nourished  No distress  Eyes: Pupils are equal, round, and reactive to light  EOM are normal    Neck: Normal range of motion  Neck supple  No JVD present  No tracheal deviation present  No thyromegaly present  Cardiovascular: Normal rate, regular rhythm and normal heart sounds  Exam reveals no gallop and no friction rub  Pulses are no weak pulses  No murmur heard  Pulses:       Dorsalis pedis pulses are 2+ on the right side, and 2+ on the left side  Posterior tibial pulses are 2+ on the right side, and 2+ on the left side  Pulmonary/Chest: Effort normal and breath sounds normal  No stridor  No respiratory distress  He has no wheezes  He has no rales  He exhibits no tenderness  Abdominal: Soft  Bowel sounds are normal  He exhibits no distension and no mass  There is no tenderness  There is no rebound and no guarding  Musculoskeletal: Normal range of motion  Feet:   Right Foot:   Skin Integrity: Negative for ulcer, skin breakdown, erythema, warmth, callus or dry skin  Left Foot:   Skin Integrity: Negative for ulcer, skin breakdown, erythema, warmth, callus or dry skin  Lymphadenopathy:     He has no cervical adenopathy  Neurological: He is alert and oriented to person, place, and time  He has normal reflexes  He displays normal reflexes  No cranial nerve deficit  He exhibits normal muscle tone  Coordination normal    Skin: Skin is warm and dry  No rash noted  He is not diaphoretic  No erythema  No pallor  Nursing note and vitals reviewed  No visits with results within 2 Month(s) from this visit     Latest known visit with results is:   Orders Only on 09/11/2019   Component Date Value    Right Eye Diabetic Retin* 09/11/2019 None     Left Eye Diabetic Retino* 09/11/2019 None       Recent Results (from the past 1008 hour(s))   POCT hemoglobin A1c    Collection Time: 11/13/19  9:54 AM   Result Value Ref Range    Hemoglobin A1C 9 6 (A) 6 5       Patient's shoes and socks removed  Right Foot/Ankle   Right Foot Inspection  Skin Exam: skin normal and skin intact no dry skin, no warmth, no callus, no erythema, no maceration, no abnormal color, no pre-ulcer, no ulcer and no callus                          Toe Exam: ROM and strength within normal limits  Sensory   Vibration: intact  Proprioception: intact   Monofilament testing: intact  Vascular  Capillary refills: < 3 seconds  The right DP pulse is 2+  The right PT pulse is 2+  Left Foot/Ankle  Left Foot Inspection  Skin Exam: skin normal and skin intactno dry skin, no warmth, no erythema, no maceration, normal color, no pre-ulcer, no ulcer and no callus                         Toe Exam: ROM and strength within normal limits                   Sensory   Vibration: intact  Proprioception: intact  Monofilament: intact  Vascular  Capillary refills: < 3 seconds  The left DP pulse is 2+  The left PT pulse is 2+  Assign Risk Category:  No deformity present; No loss of protective sensation;  No weak pulses       Risk: 0

## 2019-11-14 PROBLEM — F32.A DEPRESSION: Status: ACTIVE | Noted: 2019-11-14

## 2020-04-07 ENCOUNTER — OFFICE VISIT (OUTPATIENT)
Dept: FAMILY MEDICINE CLINIC | Facility: CLINIC | Age: 20
End: 2020-04-07
Payer: COMMERCIAL

## 2020-04-07 VITALS
TEMPERATURE: 96.6 F | HEIGHT: 72 IN | WEIGHT: 225 LBS | RESPIRATION RATE: 18 BRPM | SYSTOLIC BLOOD PRESSURE: 130 MMHG | HEART RATE: 96 BPM | BODY MASS INDEX: 30.48 KG/M2 | DIASTOLIC BLOOD PRESSURE: 80 MMHG

## 2020-04-07 DIAGNOSIS — E66.9 OBESITY (BMI 30-39.9): ICD-10-CM

## 2020-04-07 DIAGNOSIS — F32.A DEPRESSION, UNSPECIFIED DEPRESSION TYPE: ICD-10-CM

## 2020-04-07 DIAGNOSIS — E11.9 TYPE 2 DIABETES MELLITUS WITHOUT COMPLICATION, WITHOUT LONG-TERM CURRENT USE OF INSULIN (HCC): Primary | ICD-10-CM

## 2020-04-07 DIAGNOSIS — I10 ESSENTIAL HYPERTENSION: ICD-10-CM

## 2020-04-07 PROCEDURE — 2022F DILAT RTA XM EVC RTNOPTHY: CPT | Performed by: FAMILY MEDICINE

## 2020-04-07 PROCEDURE — 1036F TOBACCO NON-USER: CPT | Performed by: FAMILY MEDICINE

## 2020-04-07 PROCEDURE — 99214 OFFICE O/P EST MOD 30 MIN: CPT | Performed by: FAMILY MEDICINE

## 2020-04-07 PROCEDURE — 3075F SYST BP GE 130 - 139MM HG: CPT | Performed by: FAMILY MEDICINE

## 2020-04-07 PROCEDURE — 3079F DIAST BP 80-89 MM HG: CPT | Performed by: FAMILY MEDICINE

## 2020-04-07 PROCEDURE — 3008F BODY MASS INDEX DOCD: CPT | Performed by: FAMILY MEDICINE

## 2020-04-07 RX ORDER — LANCETS
EACH MISCELLANEOUS
Qty: 100 EACH | Refills: 2 | Status: SHIPPED | OUTPATIENT
Start: 2020-04-07 | End: 2020-10-29 | Stop reason: SDUPTHER

## 2020-05-01 DIAGNOSIS — I10 ESSENTIAL HYPERTENSION: ICD-10-CM

## 2020-05-01 DIAGNOSIS — F32.A DEPRESSION, UNSPECIFIED DEPRESSION TYPE: ICD-10-CM

## 2020-05-01 PROCEDURE — 4010F ACE/ARB THERAPY RXD/TAKEN: CPT | Performed by: FAMILY MEDICINE

## 2020-05-01 RX ORDER — ESCITALOPRAM OXALATE 10 MG/1
TABLET ORAL
Qty: 90 TABLET | Refills: 1 | Status: SHIPPED | OUTPATIENT
Start: 2020-05-01 | End: 2021-05-18 | Stop reason: SDUPTHER

## 2020-05-01 RX ORDER — LISINOPRIL 5 MG/1
TABLET ORAL
Qty: 90 TABLET | Refills: 1 | Status: SHIPPED | OUTPATIENT
Start: 2020-05-01 | End: 2020-10-29 | Stop reason: SDUPTHER

## 2020-09-01 LAB
LEFT EYE DIABETIC RETINOPATHY: NORMAL
RIGHT EYE DIABETIC RETINOPATHY: NORMAL

## 2020-10-13 ENCOUNTER — TELEPHONE (OUTPATIENT)
Dept: FAMILY MEDICINE CLINIC | Facility: CLINIC | Age: 20
End: 2020-10-13

## 2020-10-29 ENCOUNTER — OFFICE VISIT (OUTPATIENT)
Dept: FAMILY MEDICINE CLINIC | Facility: CLINIC | Age: 20
End: 2020-10-29
Payer: COMMERCIAL

## 2020-10-29 VITALS
HEART RATE: 114 BPM | TEMPERATURE: 99 F | HEIGHT: 72 IN | RESPIRATION RATE: 18 BRPM | BODY MASS INDEX: 30.61 KG/M2 | WEIGHT: 226 LBS | DIASTOLIC BLOOD PRESSURE: 60 MMHG | SYSTOLIC BLOOD PRESSURE: 120 MMHG

## 2020-10-29 DIAGNOSIS — Z00.00 ANNUAL PHYSICAL EXAM: Primary | ICD-10-CM

## 2020-10-29 DIAGNOSIS — E11.9 TYPE 2 DIABETES MELLITUS WITHOUT COMPLICATION, WITHOUT LONG-TERM CURRENT USE OF INSULIN (HCC): ICD-10-CM

## 2020-10-29 DIAGNOSIS — I10 ESSENTIAL HYPERTENSION: ICD-10-CM

## 2020-10-29 DIAGNOSIS — Z23 NEED FOR INFLUENZA VACCINATION: ICD-10-CM

## 2020-10-29 PROCEDURE — 99395 PREV VISIT EST AGE 18-39: CPT | Performed by: FAMILY MEDICINE

## 2020-10-29 PROCEDURE — 4010F ACE/ARB THERAPY RXD/TAKEN: CPT | Performed by: FAMILY MEDICINE

## 2020-10-29 PROCEDURE — 3078F DIAST BP <80 MM HG: CPT | Performed by: FAMILY MEDICINE

## 2020-10-29 PROCEDURE — 90471 IMMUNIZATION ADMIN: CPT | Performed by: FAMILY MEDICINE

## 2020-10-29 PROCEDURE — 90682 RIV4 VACC RECOMBINANT DNA IM: CPT | Performed by: FAMILY MEDICINE

## 2020-10-29 PROCEDURE — 3725F SCREEN DEPRESSION PERFORMED: CPT | Performed by: FAMILY MEDICINE

## 2020-10-29 PROCEDURE — 99214 OFFICE O/P EST MOD 30 MIN: CPT | Performed by: FAMILY MEDICINE

## 2020-10-29 PROCEDURE — 3074F SYST BP LT 130 MM HG: CPT | Performed by: FAMILY MEDICINE

## 2020-10-29 PROCEDURE — 3008F BODY MASS INDEX DOCD: CPT | Performed by: FAMILY MEDICINE

## 2020-10-29 RX ORDER — LANCETS
EACH MISCELLANEOUS
Qty: 100 EACH | Refills: 2 | Status: SHIPPED | OUTPATIENT
Start: 2020-10-29

## 2020-10-29 RX ORDER — LISINOPRIL 5 MG/1
5 TABLET ORAL DAILY
Qty: 90 TABLET | Refills: 1 | Status: SHIPPED | OUTPATIENT
Start: 2020-10-29 | End: 2021-05-18 | Stop reason: SDUPTHER

## 2020-11-06 ENCOUNTER — LAB (OUTPATIENT)
Dept: LAB | Facility: CLINIC | Age: 20
End: 2020-11-06
Payer: COMMERCIAL

## 2020-11-06 DIAGNOSIS — Z11.4 SCREENING FOR HIV (HUMAN IMMUNODEFICIENCY VIRUS): ICD-10-CM

## 2020-11-06 DIAGNOSIS — E11.9 TYPE 2 DIABETES MELLITUS WITHOUT COMPLICATION, WITHOUT LONG-TERM CURRENT USE OF INSULIN (HCC): ICD-10-CM

## 2020-11-06 DIAGNOSIS — I10 ESSENTIAL HYPERTENSION: ICD-10-CM

## 2020-11-06 LAB
ALBUMIN SERPL BCP-MCNC: 4.4 G/DL (ref 3.5–5)
ALP SERPL-CCNC: 44 U/L (ref 46–116)
ALT SERPL W P-5'-P-CCNC: 33 U/L (ref 12–78)
ANION GAP SERPL CALCULATED.3IONS-SCNC: 5 MMOL/L (ref 4–13)
AST SERPL W P-5'-P-CCNC: 11 U/L (ref 5–45)
BASOPHILS # BLD AUTO: 0.05 THOUSANDS/ΜL (ref 0–0.1)
BASOPHILS NFR BLD AUTO: 1 % (ref 0–1)
BILIRUB SERPL-MCNC: 0.51 MG/DL (ref 0.2–1)
BUN SERPL-MCNC: 9 MG/DL (ref 5–25)
CALCIUM SERPL-MCNC: 9.4 MG/DL (ref 8.3–10.1)
CHLORIDE SERPL-SCNC: 102 MMOL/L (ref 100–108)
CHOLEST SERPL-MCNC: 202 MG/DL (ref 50–200)
CO2 SERPL-SCNC: 29 MMOL/L (ref 21–32)
CREAT SERPL-MCNC: 0.84 MG/DL (ref 0.6–1.3)
CREAT UR-MCNC: 234 MG/DL
EOSINOPHIL # BLD AUTO: 0.18 THOUSAND/ΜL (ref 0–0.61)
EOSINOPHIL NFR BLD AUTO: 2 % (ref 0–6)
ERYTHROCYTE [DISTWIDTH] IN BLOOD BY AUTOMATED COUNT: 11.7 % (ref 11.6–15.1)
EST. AVERAGE GLUCOSE BLD GHB EST-MCNC: 258 MG/DL
GFR SERPL CREATININE-BSD FRML MDRD: 126 ML/MIN/1.73SQ M
GLUCOSE P FAST SERPL-MCNC: 166 MG/DL (ref 65–99)
HBA1C MFR BLD: 10.6 %
HCT VFR BLD AUTO: 45.5 % (ref 36.5–49.3)
HDLC SERPL-MCNC: 41 MG/DL
HGB BLD-MCNC: 15.5 G/DL (ref 12–17)
IMM GRANULOCYTES # BLD AUTO: 0.05 THOUSAND/UL (ref 0–0.2)
IMM GRANULOCYTES NFR BLD AUTO: 1 % (ref 0–2)
LDLC SERPL CALC-MCNC: 105 MG/DL (ref 0–100)
LDLC SERPL DIRECT ASSAY-MCNC: 143 MG/DL (ref 0–100)
LYMPHOCYTES # BLD AUTO: 3.11 THOUSANDS/ΜL (ref 0.6–4.47)
LYMPHOCYTES NFR BLD AUTO: 33 % (ref 14–44)
MCH RBC QN AUTO: 27.4 PG (ref 26.8–34.3)
MCHC RBC AUTO-ENTMCNC: 34.1 G/DL (ref 31.4–37.4)
MCV RBC AUTO: 80 FL (ref 82–98)
MICROALBUMIN UR-MCNC: 12.7 MG/L (ref 0–20)
MICROALBUMIN/CREAT 24H UR: 5 MG/G CREATININE (ref 0–30)
MONOCYTES # BLD AUTO: 0.71 THOUSAND/ΜL (ref 0.17–1.22)
MONOCYTES NFR BLD AUTO: 8 % (ref 4–12)
NEUTROPHILS # BLD AUTO: 5.27 THOUSANDS/ΜL (ref 1.85–7.62)
NEUTS SEG NFR BLD AUTO: 55 % (ref 43–75)
NONHDLC SERPL-MCNC: 161 MG/DL
NRBC BLD AUTO-RTO: 0 /100 WBCS
PLATELET # BLD AUTO: 314 THOUSANDS/UL (ref 149–390)
PMV BLD AUTO: 9.3 FL (ref 8.9–12.7)
POTASSIUM SERPL-SCNC: 3.7 MMOL/L (ref 3.5–5.3)
PROT SERPL-MCNC: 8 G/DL (ref 6.4–8.2)
RBC # BLD AUTO: 5.66 MILLION/UL (ref 3.88–5.62)
SODIUM SERPL-SCNC: 136 MMOL/L (ref 136–145)
TRIGL SERPL-MCNC: 278 MG/DL
TSH SERPL DL<=0.05 MIU/L-ACNC: 2.43 UIU/ML (ref 0.46–3.98)
WBC # BLD AUTO: 9.37 THOUSAND/UL (ref 4.31–10.16)

## 2020-11-06 PROCEDURE — 82570 ASSAY OF URINE CREATININE: CPT

## 2020-11-06 PROCEDURE — 84443 ASSAY THYROID STIM HORMONE: CPT

## 2020-11-06 PROCEDURE — 3046F HEMOGLOBIN A1C LEVEL >9.0%: CPT | Performed by: FAMILY MEDICINE

## 2020-11-06 PROCEDURE — 36415 COLL VENOUS BLD VENIPUNCTURE: CPT

## 2020-11-06 PROCEDURE — 83721 ASSAY OF BLOOD LIPOPROTEIN: CPT

## 2020-11-06 PROCEDURE — 82043 UR ALBUMIN QUANTITATIVE: CPT

## 2020-11-06 PROCEDURE — 87389 HIV-1 AG W/HIV-1&-2 AB AG IA: CPT

## 2020-11-06 PROCEDURE — 3061F NEG MICROALBUMINURIA REV: CPT | Performed by: FAMILY MEDICINE

## 2020-11-06 PROCEDURE — 80053 COMPREHEN METABOLIC PANEL: CPT

## 2020-11-06 PROCEDURE — 83036 HEMOGLOBIN GLYCOSYLATED A1C: CPT

## 2020-11-06 PROCEDURE — 80061 LIPID PANEL: CPT

## 2020-11-06 PROCEDURE — 85025 COMPLETE CBC W/AUTO DIFF WBC: CPT

## 2020-11-07 LAB — HIV 1+2 AB+HIV1 P24 AG SERPL QL IA: NORMAL

## 2020-12-04 ENCOUNTER — TELEPHONE (OUTPATIENT)
Dept: FAMILY MEDICINE CLINIC | Facility: CLINIC | Age: 20
End: 2020-12-04

## 2020-12-04 DIAGNOSIS — E11.9 TYPE 2 DIABETES MELLITUS WITHOUT COMPLICATION, WITHOUT LONG-TERM CURRENT USE OF INSULIN (HCC): ICD-10-CM

## 2020-12-04 RX ORDER — METFORMIN HYDROCHLORIDE 500 MG/1
TABLET, EXTENDED RELEASE ORAL
Qty: 90 TABLET | Refills: 5 | Status: SHIPPED | OUTPATIENT
Start: 2020-12-04 | End: 2021-05-18 | Stop reason: SDUPTHER

## 2021-01-07 ENCOUNTER — TELEPHONE (OUTPATIENT)
Dept: FAMILY MEDICINE CLINIC | Facility: CLINIC | Age: 21
End: 2021-01-07

## 2021-01-07 NOTE — TELEPHONE ENCOUNTER
Attempted to contact patient to discuss recovery from COVID 19 and possible plasma donation  Message left on voicemail with contact information for return call

## 2021-03-10 DIAGNOSIS — Z23 ENCOUNTER FOR IMMUNIZATION: ICD-10-CM

## 2021-04-27 ENCOUNTER — IMMUNIZATIONS (OUTPATIENT)
Dept: FAMILY MEDICINE CLINIC | Facility: HOSPITAL | Age: 21
End: 2021-04-27

## 2021-04-27 DIAGNOSIS — Z23 ENCOUNTER FOR IMMUNIZATION: Primary | ICD-10-CM

## 2021-04-27 PROCEDURE — 91300 SARS-COV-2 / COVID-19 MRNA VACCINE (PFIZER-BIONTECH) 30 MCG: CPT

## 2021-04-27 PROCEDURE — 0001A SARS-COV-2 / COVID-19 MRNA VACCINE (PFIZER-BIONTECH) 30 MCG: CPT

## 2021-04-29 ENCOUNTER — TELEPHONE (OUTPATIENT)
Dept: FAMILY MEDICINE CLINIC | Facility: CLINIC | Age: 21
End: 2021-04-29

## 2021-04-29 NOTE — TELEPHONE ENCOUNTER
Patient overdue for DM check up   Called patient and scheduled for DM appointment 05/18/21 @ [phone]

## 2021-05-18 ENCOUNTER — OFFICE VISIT (OUTPATIENT)
Dept: FAMILY MEDICINE CLINIC | Facility: CLINIC | Age: 21
End: 2021-05-18
Payer: COMMERCIAL

## 2021-05-18 VITALS
HEART RATE: 95 BPM | RESPIRATION RATE: 18 BRPM | OXYGEN SATURATION: 99 % | BODY MASS INDEX: 30.46 KG/M2 | TEMPERATURE: 98.3 F | WEIGHT: 217.6 LBS | DIASTOLIC BLOOD PRESSURE: 64 MMHG | HEIGHT: 71 IN | SYSTOLIC BLOOD PRESSURE: 118 MMHG

## 2021-05-18 DIAGNOSIS — F32.A DEPRESSION, UNSPECIFIED DEPRESSION TYPE: ICD-10-CM

## 2021-05-18 DIAGNOSIS — E11.9 TYPE 2 DIABETES MELLITUS WITHOUT COMPLICATION, WITHOUT LONG-TERM CURRENT USE OF INSULIN (HCC): Primary | ICD-10-CM

## 2021-05-18 DIAGNOSIS — E66.9 OBESITY (BMI 30-39.9): ICD-10-CM

## 2021-05-18 DIAGNOSIS — I10 ESSENTIAL HYPERTENSION: ICD-10-CM

## 2021-05-18 LAB — SL AMB POCT HEMOGLOBIN AIC: 12.2 (ref ?–6.5)

## 2021-05-18 PROCEDURE — 99214 OFFICE O/P EST MOD 30 MIN: CPT | Performed by: FAMILY MEDICINE

## 2021-05-18 PROCEDURE — 4010F ACE/ARB THERAPY RXD/TAKEN: CPT | Performed by: FAMILY MEDICINE

## 2021-05-18 PROCEDURE — 83036 HEMOGLOBIN GLYCOSYLATED A1C: CPT | Performed by: FAMILY MEDICINE

## 2021-05-18 RX ORDER — LISINOPRIL 5 MG/1
5 TABLET ORAL DAILY
Qty: 90 TABLET | Refills: 1 | Status: SHIPPED | OUTPATIENT
Start: 2021-05-18

## 2021-05-18 RX ORDER — GLIMEPIRIDE 2 MG/1
2 TABLET ORAL
Qty: 90 TABLET | Refills: 1 | Status: SHIPPED | OUTPATIENT
Start: 2021-05-18 | End: 2021-06-10 | Stop reason: SDUPTHER

## 2021-05-18 RX ORDER — METFORMIN HYDROCHLORIDE 500 MG/1
TABLET, EXTENDED RELEASE ORAL
Qty: 90 TABLET | Refills: 5 | Status: SHIPPED | OUTPATIENT
Start: 2021-05-18

## 2021-05-18 RX ORDER — ESCITALOPRAM OXALATE 10 MG/1
10 TABLET ORAL DAILY
Qty: 90 TABLET | Refills: 1 | Status: SHIPPED | OUTPATIENT
Start: 2021-05-18

## 2021-05-18 NOTE — PROGRESS NOTES
Assessment/Plan:  Problem List Items Addressed This Visit        Endocrine    Type 2 diabetes mellitus ([facility] [address] ) - Primary       Lab Results   Component Value Date    HGBA1C 12 2 (A) 05/18/2021   The patient's HgbA1c is high at 12 2 which demonstrates very poor control of his diabetes  He admits to not regularly taking his medication  I had a long discussion with him stating that he needs to be serious about this and poorly controlled diabetes could increase his risk kidney damage, diabetic neuropathy, retinopathy and increased infection among other things  He needs to be series about his diabetes  The patient did start back on his medication regularly and is willing to do the diabetic Education course again as a refresher  He will go for the lab work as ordered since he is overdue and we will bring him back for follow-up in 3 weeks to review the labs and adjust his medication if necessary  Relevant Medications    lisinopril (ZESTRIL) 5 mg tablet    metFORMIN (GLUCOPHAGE-XR) 500 mg 24 hr tablet    glimepiride (AMARYL) 2 mg tablet    Other Relevant Orders    POCT hemoglobin A1c (Completed)    CBC and differential    TSH, 3rd generation with Free T4 reflex    Comprehensive metabolic panel    Hemoglobin A1C    LDL cholesterol, direct    Lipid panel    Microalbumin / creatinine urine ratio    Ambulatory referral to Diabetic Education       Cardiovascular and Mediastinum    Essential hypertension     The patient's blood pressure is well controlled with lisinopril  We have made no changes today           Relevant Medications    lisinopril (ZESTRIL) 5 mg tablet    glimepiride (AMARYL) 2 mg tablet    Other Relevant Orders    CBC and differential    TSH, 3rd generation with Free T4 reflex    Comprehensive metabolic panel    Hemoglobin A1C    LDL cholesterol, direct    Lipid panel    Microalbumin / creatinine urine ratio       Other    Depression     The patient continues to remain stable on the current dose of his escitalopram   He needs to be more consistent with taking the medication  We will continue to monitor him closely  Relevant Medications    escitalopram (LEXAPRO) 10 mg tablet    Obesity (BMI 30-39  9)     I advised him to start weighing himself daily to track his weight  The patient was advised to start eating 7 non starchy vegetables and 3 fruits every day as well as 10-12 nuts per day  He was also advised to add on psyllium fiber 1 tbsp twice a day for goal of 13 g per day  He was advised to drink 16 oz of water before all meals and to avoid any artificially sweetened drinks  He was also advised to try high intensity interval training for 4 minutes per day along with resistance exercises  I also advised him to keep a food diary to track everything that he is eating in the course of the day  At meals, he should always cut his dish in  half and eat half  his meal and then determine if he is still hungry prior to eating the additional half  We will see him back in the office as scheduled  Relevant Orders    CBC and differential    TSH, 3rd generation with Free T4 reflex    Comprehensive metabolic panel    Hemoglobin A1C    LDL cholesterol, direct    Lipid panel    Microalbumin / creatinine urine ratio          Return in about 3 weeks (around 6/8/2021) for Recheck  Subjective:   Chief Complaint   Patient presents with    Follow-up     3 month dm check-up        Patient ID: Supriya Greenwood is a 23 y o  male presents today for a routine checkup  Supriya Greenwood is a 23 y o  male who presents today for follow-up of his type 2 diabetes and hypertension  He states that he has not been consistent with taking his medication  There is no polyuria or polydipisia  He is trying to make better food choices and cooking for himself  He is due for blood work  The patient denies any chest pain, shortness of breath, or palpitations  There is no edema  There are no headaches or visual changes    There is no lightheadedness, dizziness, or fainting spells  There is no numbness or tingling in his feet  He had his first Covid shot and is scheduled for the second one next week  There is no blurry vision  Diabetes  He presents for his follow-up diabetic visit  He has type 2 diabetes mellitus  His disease course has been fluctuating  Pertinent negatives for hypoglycemia include no headaches or sweats  Pertinent negatives for diabetes include no blurred vision, no chest pain, no fatigue, no foot paresthesias, no foot ulcerations, no polydipsia, no polyphagia, no polyuria, no visual change, no weakness and no weight loss  Symptoms are stable  He sees a podiatrist Eye exam is current  Hypertension  This is a chronic problem  The current episode started more than 1 year ago  The problem is unchanged  The problem is controlled  Pertinent negatives include no anxiety, blurred vision, chest pain, headaches, malaise/fatigue, neck pain, orthopnea, palpitations, peripheral edema, PND, shortness of breath or sweats  The current treatment provides no improvement  The following portions of the patient's history were reviewed and updated as appropriate: allergies, current medications, past family history, past medical history, past social history, past surgical history and problem list   Patient Active Problem List   Diagnosis    Essential hypertension    Obesity (BMI 30-39  9)    Type 2 diabetes mellitus ([facility] [address] )    Depression     Past Medical History:   Diagnosis Date    Fracture of navicular bone of left foot     WITH ROUTINE HEALING, SUBSEQUENT ENCOUNTER  LAST ASSESSED: 5/15/15     History reviewed  No pertinent surgical history    No Known Allergies  Family History   Problem Relation Age of Onset    Anemia Mother     Diabetes Maternal Grandmother     Diabetes Paternal Grandmother      Social History     Socioeconomic History    Marital status: Single     Spouse name: Not on file    Number of children: Not on file  Years of education: Not on file    Highest education level: Not on file   Occupational History    Not on file   Social Needs    Financial resource strain: Not hard at all    Food insecurity     Worry: Never true     Inability: Never true   [employer] needs     Medical: No     Non-medical: No   Tobacco Use    Smoking status: Never Smoker    Smokeless tobacco: Never Used   Substance and Sexual Activity    Alcohol use: Never     Frequency: Never    Drug use: Never    Sexual activity: Never   Lifestyle    Physical activity     Days per week: 4 days     Minutes per session: Not on file    Stress: Not at all   Relationships    Social connections     Talks on phone: More than three times a week     Gets together: Not on file     Attends Rastafarian service: Never     Active member of club or organization: No     Attends meetings of clubs or organizations: Never     Relationship status: Never     Intimate partner violence     Fear of current or ex partner: No     Emotionally abused: No     Physically abused: No     Forced sexual activity: No   Other Topics Concern    Not on file   Social History Narrative    HIGH SCHOOL STUDENT     Current Outpatient Medications on File Prior to Visit   Medication Sig Dispense Refill    glucose blood (ONE TOUCH ULTRA TEST) test strip Type 2 DM no insulin tests once daily 100 each 2    Lancets (onetouch ultrasoft) lancets Type 2 DM no insulin tests once daily 100 each 2     No current facility-administered medications on file prior to visit  Review of Systems   Constitutional: Negative  Negative for fatigue, malaise/fatigue and weight loss  HENT: Negative  Eyes: Negative  Negative for blurred vision  Respiratory: Negative  Negative for shortness of breath  Cardiovascular: Negative  Negative for chest pain, palpitations, orthopnea and PND  Gastrointestinal: Negative  Endocrine: Negative  Negative for polydipsia, polyphagia and polyuria  Genitourinary: Negative  Musculoskeletal: Negative  Negative for neck pain  Skin: Negative  Allergic/Immunologic: Negative  Neurological: Negative  Negative for weakness and headaches  Hematological: Negative  Psychiatric/Behavioral: Negative  Objective:  Vitals:    05/18/21 1435   BP: 118/64   BP Location: Left arm   Patient Position: Sitting   Cuff Size: Standard   Pulse: 95   Resp: 18   Temp: 98 3 °F (36 8 °C)   TempSrc: Tympanic   SpO2: 99%   Weight: 98 7 kg (217 lb 9 6 oz)   Height: 5' 11" (1 803 m)     Body mass index is 30 35 kg/m²  Physical Exam  Vitals signs and nursing note reviewed  Constitutional:       General: He is not in acute distress  Appearance: He is well-developed  He is not diaphoretic  Eyes:      Pupils: Pupils are equal, round, and reactive to light  Neck:      Musculoskeletal: Normal range of motion and neck supple  Thyroid: No thyromegaly  Vascular: No JVD  Trachea: No tracheal deviation  Cardiovascular:      Rate and Rhythm: Normal rate and regular rhythm  Pulses: no weak pulses          Dorsalis pedis pulses are 2+ on the right side and 2+ on the left side  Posterior tibial pulses are 2+ on the right side and 2+ on the left side  Heart sounds: Normal heart sounds  No murmur  No friction rub  No gallop  Pulmonary:      Effort: Pulmonary effort is normal  No respiratory distress  Breath sounds: Normal breath sounds  No stridor  No wheezing or rales  Chest:      Chest wall: No tenderness  Abdominal:      General: Bowel sounds are normal  There is no distension  Palpations: Abdomen is soft  There is no mass  Tenderness: There is no abdominal tenderness  There is no guarding or rebound  Musculoskeletal: Normal range of motion  Feet:      Right foot:      Skin integrity: No ulcer, skin breakdown, erythema, warmth, callus or dry skin        Left foot:      Skin integrity: No ulcer, skin breakdown, erythema, warmth, callus or dry skin  Lymphadenopathy:      Cervical: No cervical adenopathy  Skin:     General: Skin is warm and dry  Coloration: Skin is not pale  Findings: No erythema or rash  Neurological:      Mental Status: He is alert and oriented to person, place, and time  Cranial Nerves: No cranial nerve deficit  Motor: No abnormal muscle tone  Coordination: Coordination normal       Deep Tendon Reflexes: Reflexes are normal and symmetric  Reflexes normal          Diabetic Foot Exam    Patient's shoes and socks removed  Right Foot/Ankle   Right Foot Inspection  Skin Exam: skin normal and skin intact no dry skin, no warmth, no callus, no erythema, no maceration, no abnormal color, no pre-ulcer, no ulcer and no callus                          Toe Exam: ROM and strength within normal limits  Sensory   Vibration: intact  Proprioception: intact   Monofilament testing: intact  Vascular  Capillary refills: < 3 seconds  The right DP pulse is 2+  The right PT pulse is 2+  Left Foot/Ankle  Left Foot Inspection  Skin Exam: skin normal and skin intactno dry skin, no warmth, no erythema, no maceration, normal color, no pre-ulcer, no ulcer and no callus                         Toe Exam: ROM and strength within normal limits                   Sensory   Vibration: intact  Proprioception: intact  Monofilament: intact  Vascular  Capillary refills: < 3 seconds  The left DP pulse is 2+  The left PT pulse is 2+  Assign Risk Category:  No deformity present; No loss of protective sensation; No weak pulses       Risk: 0    Recent Results (from the past 1008 hour(s))   POCT hemoglobin A1c    Collection Time: 05/18/21  2:51 PM   Result Value Ref Range    Hemoglobin A1C 12 2 (A) 6 5         BMI Counseling: Body mass index is 30 35 kg/m²   The BMI is above normal  Nutrition recommendations include decreasing portion sizes, encouraging healthy choices of fruits and vegetables, decreasing fast food intake, consuming healthier snacks, limiting drinks that contain sugar, moderation in carbohydrate intake, increasing intake of lean protein, reducing intake of saturated and trans fat and reducing intake of cholesterol  Exercise recommendations include exercising 3-5 times per week and strength training exercises  No pharmacotherapy was ordered  Patient referred to PCP due to patient being overweight

## 2021-05-19 NOTE — ASSESSMENT & PLAN NOTE
The patient continues to remain stable on the current dose of his escitalopram   He needs to be more consistent with taking the medication  We will continue to monitor him closely

## 2021-05-19 NOTE — ASSESSMENT & PLAN NOTE
I advised him to start weighing himself daily to track his weight  The patient was advised to start eating 7 non starchy vegetables and 3 fruits every day as well as 10-12 nuts per day  He was also advised to add on psyllium fiber 1 tbsp twice a day for goal of 13 g per day  He was advised to drink 16 oz of water before all meals and to avoid any artificially sweetened drinks  He was also advised to try high intensity interval training for 4 minutes per day along with resistance exercises  I also advised him to keep a food diary to track everything that he is eating in the course of the day  At meals, he should always cut his dish in  half and eat half  his meal and then determine if he is still hungry prior to eating the additional half  We will see him back in the office as scheduled

## 2021-05-19 NOTE — ASSESSMENT & PLAN NOTE
Lab Results   Component Value Date    HGBA1C 12 2 (A) 05/18/2021   The patient's HgbA1c is high at 12 2 which demonstrates very poor control of his diabetes  He admits to not regularly taking his medication  I had a long discussion with him stating that he needs to be serious about this and poorly controlled diabetes could increase his risk kidney damage, diabetic neuropathy, retinopathy and increased infection among other things  He needs to be serious about his diabetes  The patient did start back on his medication regularly and is willing to do the diabetic Education course again as a refresher  He will go for the lab work as ordered since he is overdue and we will bring him back for follow-up in 3 weeks to review the labs and adjust his medication if necessary

## 2021-05-24 ENCOUNTER — IMMUNIZATIONS (OUTPATIENT)
Dept: FAMILY MEDICINE CLINIC | Facility: HOSPITAL | Age: 21
End: 2021-05-24

## 2021-05-24 DIAGNOSIS — Z23 ENCOUNTER FOR IMMUNIZATION: Primary | ICD-10-CM

## 2021-05-24 PROCEDURE — 0002A SARS-COV-2 / COVID-19 MRNA VACCINE (PFIZER-BIONTECH) 30 MCG: CPT

## 2021-05-24 PROCEDURE — 91300 SARS-COV-2 / COVID-19 MRNA VACCINE (PFIZER-BIONTECH) 30 MCG: CPT

## 2021-06-04 ENCOUNTER — APPOINTMENT (OUTPATIENT)
Dept: LAB | Facility: CLINIC | Age: 21
End: 2021-06-04
Payer: COMMERCIAL

## 2021-06-04 DIAGNOSIS — E11.9 TYPE 2 DIABETES MELLITUS WITHOUT COMPLICATION, WITHOUT LONG-TERM CURRENT USE OF INSULIN (HCC): ICD-10-CM

## 2021-06-04 DIAGNOSIS — E66.9 OBESITY (BMI 30-39.9): ICD-10-CM

## 2021-06-04 DIAGNOSIS — I10 ESSENTIAL HYPERTENSION: ICD-10-CM

## 2021-06-04 LAB
ALBUMIN SERPL BCP-MCNC: 4.1 G/DL (ref 3.5–5)
ALP SERPL-CCNC: 48 U/L (ref 46–116)
ALT SERPL W P-5'-P-CCNC: 28 U/L (ref 12–78)
ANION GAP SERPL CALCULATED.3IONS-SCNC: 9 MMOL/L (ref 4–13)
AST SERPL W P-5'-P-CCNC: 13 U/L (ref 5–45)
BASOPHILS # BLD AUTO: 0.05 THOUSANDS/ΜL (ref 0–0.1)
BASOPHILS NFR BLD AUTO: 0 % (ref 0–1)
BILIRUB SERPL-MCNC: 0.49 MG/DL (ref 0.2–1)
BUN SERPL-MCNC: 10 MG/DL (ref 5–25)
CALCIUM SERPL-MCNC: 9.4 MG/DL (ref 8.3–10.1)
CHLORIDE SERPL-SCNC: 99 MMOL/L (ref 100–108)
CHOLEST SERPL-MCNC: 208 MG/DL (ref 50–200)
CO2 SERPL-SCNC: 28 MMOL/L (ref 21–32)
CREAT SERPL-MCNC: 0.7 MG/DL (ref 0.6–1.3)
CREAT UR-MCNC: 223 MG/DL
EOSINOPHIL # BLD AUTO: 0.23 THOUSAND/ΜL (ref 0–0.61)
EOSINOPHIL NFR BLD AUTO: 2 % (ref 0–6)
ERYTHROCYTE [DISTWIDTH] IN BLOOD BY AUTOMATED COUNT: 11.8 % (ref 11.6–15.1)
EST. AVERAGE GLUCOSE BLD GHB EST-MCNC: 275 MG/DL
GFR SERPL CREATININE-BSD FRML MDRD: 135 ML/MIN/1.73SQ M
GLUCOSE P FAST SERPL-MCNC: 180 MG/DL (ref 65–99)
HBA1C MFR BLD: 11.2 %
HCT VFR BLD AUTO: 45.5 % (ref 36.5–49.3)
HDLC SERPL-MCNC: 39 MG/DL
HGB BLD-MCNC: 15.1 G/DL (ref 12–17)
IMM GRANULOCYTES # BLD AUTO: 0.08 THOUSAND/UL (ref 0–0.2)
IMM GRANULOCYTES NFR BLD AUTO: 1 % (ref 0–2)
LDLC SERPL CALC-MCNC: 92 MG/DL (ref 0–100)
LDLC SERPL DIRECT ASSAY-MCNC: 130 MG/DL (ref 0–100)
LYMPHOCYTES # BLD AUTO: 4.49 THOUSANDS/ΜL (ref 0.6–4.47)
LYMPHOCYTES NFR BLD AUTO: 39 % (ref 14–44)
MCH RBC QN AUTO: 26.9 PG (ref 26.8–34.3)
MCHC RBC AUTO-ENTMCNC: 33.2 G/DL (ref 31.4–37.4)
MCV RBC AUTO: 81 FL (ref 82–98)
MICROALBUMIN UR-MCNC: 18.9 MG/L (ref 0–20)
MICROALBUMIN/CREAT 24H UR: 8 MG/G CREATININE (ref 0–30)
MONOCYTES # BLD AUTO: 0.67 THOUSAND/ΜL (ref 0.17–1.22)
MONOCYTES NFR BLD AUTO: 6 % (ref 4–12)
NEUTROPHILS # BLD AUTO: 5.99 THOUSANDS/ΜL (ref 1.85–7.62)
NEUTS SEG NFR BLD AUTO: 52 % (ref 43–75)
NONHDLC SERPL-MCNC: 169 MG/DL
NRBC BLD AUTO-RTO: 0 /100 WBCS
PLATELET # BLD AUTO: 339 THOUSANDS/UL (ref 149–390)
PMV BLD AUTO: 9.3 FL (ref 8.9–12.7)
POTASSIUM SERPL-SCNC: 3.7 MMOL/L (ref 3.5–5.3)
PROT SERPL-MCNC: 7.4 G/DL (ref 6.4–8.2)
RBC # BLD AUTO: 5.62 MILLION/UL (ref 3.88–5.62)
SODIUM SERPL-SCNC: 136 MMOL/L (ref 136–145)
TRIGL SERPL-MCNC: 386 MG/DL
TSH SERPL DL<=0.05 MIU/L-ACNC: 3.15 UIU/ML (ref 0.36–3.74)
WBC # BLD AUTO: 11.51 THOUSAND/UL (ref 4.31–10.16)

## 2021-06-04 PROCEDURE — 80053 COMPREHEN METABOLIC PANEL: CPT

## 2021-06-04 PROCEDURE — 82043 UR ALBUMIN QUANTITATIVE: CPT

## 2021-06-04 PROCEDURE — 36415 COLL VENOUS BLD VENIPUNCTURE: CPT

## 2021-06-04 PROCEDURE — 3046F HEMOGLOBIN A1C LEVEL >9.0%: CPT | Performed by: FAMILY MEDICINE

## 2021-06-04 PROCEDURE — 83721 ASSAY OF BLOOD LIPOPROTEIN: CPT

## 2021-06-04 PROCEDURE — 85025 COMPLETE CBC W/AUTO DIFF WBC: CPT

## 2021-06-04 PROCEDURE — 3061F NEG MICROALBUMINURIA REV: CPT | Performed by: FAMILY MEDICINE

## 2021-06-04 PROCEDURE — 83036 HEMOGLOBIN GLYCOSYLATED A1C: CPT

## 2021-06-04 PROCEDURE — 82570 ASSAY OF URINE CREATININE: CPT

## 2021-06-04 PROCEDURE — 80061 LIPID PANEL: CPT

## 2021-06-04 PROCEDURE — 84443 ASSAY THYROID STIM HORMONE: CPT

## 2021-06-10 ENCOUNTER — OFFICE VISIT (OUTPATIENT)
Dept: FAMILY MEDICINE CLINIC | Facility: CLINIC | Age: 21
End: 2021-06-10
Payer: COMMERCIAL

## 2021-06-10 VITALS
WEIGHT: 220.6 LBS | HEART RATE: 82 BPM | BODY MASS INDEX: 30.88 KG/M2 | OXYGEN SATURATION: 98 % | DIASTOLIC BLOOD PRESSURE: 78 MMHG | HEIGHT: 71 IN | SYSTOLIC BLOOD PRESSURE: 110 MMHG | TEMPERATURE: 98.3 F

## 2021-06-10 DIAGNOSIS — E66.9 OBESITY (BMI 30-39.9): ICD-10-CM

## 2021-06-10 DIAGNOSIS — I10 ESSENTIAL HYPERTENSION: ICD-10-CM

## 2021-06-10 DIAGNOSIS — E11.9 TYPE 2 DIABETES MELLITUS WITHOUT COMPLICATION, WITHOUT LONG-TERM CURRENT USE OF INSULIN (HCC): Primary | ICD-10-CM

## 2021-06-10 PROCEDURE — 3074F SYST BP LT 130 MM HG: CPT | Performed by: FAMILY MEDICINE

## 2021-06-10 PROCEDURE — 1036F TOBACCO NON-USER: CPT | Performed by: FAMILY MEDICINE

## 2021-06-10 PROCEDURE — 3078F DIAST BP <80 MM HG: CPT | Performed by: FAMILY MEDICINE

## 2021-06-10 PROCEDURE — 3725F SCREEN DEPRESSION PERFORMED: CPT | Performed by: FAMILY MEDICINE

## 2021-06-10 PROCEDURE — 99214 OFFICE O/P EST MOD 30 MIN: CPT | Performed by: FAMILY MEDICINE

## 2021-06-10 PROCEDURE — 3008F BODY MASS INDEX DOCD: CPT | Performed by: FAMILY MEDICINE

## 2021-06-10 RX ORDER — GLIMEPIRIDE 4 MG/1
4 TABLET ORAL
Qty: 30 TABLET | Refills: 5 | Status: SHIPPED | OUTPATIENT
Start: 2021-06-10

## 2021-06-10 NOTE — PROGRESS NOTES
02/05/18    Jodie Costa  [address] [place] [address]              Thank you for choosing [facility] EMERGENCY SERVICES for your care.  While you were in the emergency department tests were performed.  The final results or interpretation of the tests were recently completed. We have attempted to call you regarding these results but were not able to contact you.      Please contact your primary care doctor or take this letter to your primary care doctor's office as soon as possible to discuss what additional testing or treatment may be required.  If you do not have a primary care doctor, please return to the ED along with  this letter and we will assist you with arranging follow up care.     As always your health is our primary concern and we want to thank you for choosing [facility] for your care.            MIRANDA Allen [facility] EMERGENCY SERVICES  [address] [place] [address]  [phone]       Assessment/Plan:  Problem List Items Addressed This Visit        Endocrine    Type 2 diabetes mellitus ([facility] [address] ) - Primary       Lab Results   Component Value Date    HGBA1C 11 2 (H) 06/04/2021   The patient's hemoglobin A1c is high demonstrating poor control of his type 2 diabetes  We had a discussion that he really needs to work on improving his diet and exercise and needs to be consistent with taking his medication  We are going to increase the dose of the glimepiride as ordered to 4 mg daily  He will start checking his blood sugar regularly at home  We will see him back as scheduled he will get lab work done prior to that visit  Relevant Medications    glimepiride (AMARYL) 4 mg tablet    Other Relevant Orders    Comprehensive metabolic panel    Hemoglobin A1C    LDL cholesterol, direct    Lipid panel    CBC and differential       Cardiovascular and Mediastinum    Essential hypertension     Patient's blood pressure stable on his current medication  We have made no changes  Relevant Medications    glimepiride (AMARYL) 4 mg tablet    Other Relevant Orders    Comprehensive metabolic panel    Hemoglobin A1C    LDL cholesterol, direct    Lipid panel    CBC and differential       Other    Obesity (BMI 30-39  9)     I advised him to start weighing himself daily to track his weight  The patient was advised to start eating 7 non starchy vegetables and 3 fruits every day as well as 10-12 nuts per day  He was also advised to add on psyllium fiber 1 tbsp twice a day for goal of 13 g per day  He was advised to drink 16 oz of water before all meals and to avoid any artificially sweetened drinks  He was also advised to try high intensity interval training for 4 minutes per day along with resistance exercises  I also advised him to keep a food diary to track everything that he is eating in the course of the day    At meals, he should always cut his dish in  half and eat half  his meal and then determine if he is still hungry prior to eating the additional half  We will see him back in the office as scheduled  Relevant Medications    glimepiride (AMARYL) 4 mg tablet    Other Relevant Orders    Comprehensive metabolic panel    Hemoglobin A1C    LDL cholesterol, direct    Lipid panel    CBC and differential          Return in about 3 months (around 9/10/2021) for Recheck  Subjective:   Chief Complaint   Patient presents with    Follow-up     Check up Diabetes        Patient ID: Supriya Greenwood is a 23 y o  male presents today for routine checkup  Supriya Greenwood is a 23 y o  male presents today for follow-up of his type 2 diabetes, hypertension, and obesity  He is trying to be consistent with taking his medication and is trying to improve his diet  He denies any polyuria, polydipsia, or polyphagia  The patient denies any chest pain, shortness of breath, or palpitations  There is no edema  There are no headaches or visual changes  There is no lightheadedness, dizziness, or fainting spells  The patient currently denies any nausea, vomiting, or GERD symptoms  he has normal bowel movements and normal urine output  he has a normal appetite  Diabetes  He presents for his follow-up diabetic visit  He has type 2 diabetes mellitus  His disease course has been fluctuating  Pertinent negatives for diabetes include no blurred vision, no chest pain, no foot paresthesias, no foot ulcerations, no polydipsia, no polyphagia, no polyuria, no visual change, no weakness and no weight loss  Symptoms are worsening  The following portions of the patient's history were reviewed and updated as appropriate: allergies, current medications, past family history, past medical history, past social history, past surgical history and problem list   Patient Active Problem List   Diagnosis    Essential hypertension    Obesity (BMI 30-39  9)    Type 2 diabetes mellitus ([facility] [address] )    Depression     Past Medical History:   Diagnosis Date  Fracture of navicular bone of left foot     WITH ROUTINE HEALING, SUBSEQUENT ENCOUNTER  LAST ASSESSED: 5/15/15     History reviewed  No pertinent surgical history  No Known Allergies  Family History   Problem Relation Age of Onset    Anemia Mother     Diabetes Maternal Grandmother     Diabetes Paternal Grandmother      Social History     Socioeconomic History    Marital status: Single     Spouse name: Not on file    Number of children: Not on file    Years of education: Not on file    Highest education level: Not on file   Occupational History    Not on file   Tobacco Use    Smoking status: Never Smoker    Smokeless tobacco: Never Used   Vaping Use    Vaping Use: Never used   Substance and Sexual Activity    Alcohol use: Never    Drug use: Never    Sexual activity: Never   Other Topics Concern    Not on file   Social History Narrative    HIGH SCHOOL STUDENT     Social Determinants of Health     Financial Resource Strain: Low Risk     Difficulty of Paying Living Expenses: Not hard at all   Food Insecurity: No Food Insecurity    Worried About [address] flexReceipts in the Last Year: Never true    [address] [facility] in the Last Year: Never true   Transportation Needs: No Transportation Needs    Lack of Transportation (Medical): No    Lack of Transportation (Non-Medical):  No   Physical Activity: Unknown    Days of Exercise per Week: 4 days    Minutes of Exercise per Session: Not on file   Stress: No Stress Concern Present    Feeling of Stress : Not at all   Social Connections: Socially Isolated    Frequency of Communication with Friends and Family: More than three times a week    Frequency of Social Gatherings with Friends and Family: Not on file    Attends Rastafari Services: Never    Active Member of Clubs or Organizations: No    Attends Club or Organization Meetings: Never    Marital Status: Never    Intimate Partner Violence: Not At Risk    Fear of Current or Ex-Partner: No    Emotionally Abused: No    Physically Abused: No    Sexually Abused: No     Current Outpatient Medications on File Prior to Visit   Medication Sig Dispense Refill    escitalopram (LEXAPRO) 10 mg tablet Take 1 tablet (10 mg total) by mouth daily 90 tablet 1    glucose blood (ONE TOUCH ULTRA TEST) test strip Type 2 DM no insulin tests once daily 100 each 2    Lancets (onetouch ultrasoft) lancets Type 2 DM no insulin tests once daily 100 each 2    lisinopril (ZESTRIL) 5 mg tablet Take 1 tablet (5 mg total) by mouth daily 90 tablet 1    metFORMIN (GLUCOPHAGE-XR) 500 mg 24 hr tablet Take 3 tablets daily 90 tablet 5     No current facility-administered medications on file prior to visit  Review of Systems   Constitutional: Negative  Negative for weight loss  HENT: Negative  Eyes: Negative  Negative for blurred vision  Respiratory: Negative  Cardiovascular: Negative  Negative for chest pain  Gastrointestinal: Negative  Endocrine: Negative  Negative for polydipsia, polyphagia and polyuria  Genitourinary: Negative  Musculoskeletal: Negative  Skin: Negative  Allergic/Immunologic: Negative  Neurological: Negative  Negative for weakness  Hematological: Negative  Psychiatric/Behavioral: Negative  Objective:  Vitals:    06/10/21 1609 06/10/21 1629   BP: 120/60 110/78   BP Location: Left arm    Patient Position: Sitting    Cuff Size: Large    Pulse: (!) 113 82   Temp: 98 3 °F (36 8 °C)    TempSrc: Tympanic    SpO2: 98%    Weight: 100 kg (220 lb 9 6 oz)    Height: 5' 11" (1 803 m)      Body mass index is 30 77 kg/m²  Physical Exam  Vitals and nursing note reviewed  Constitutional:       General: He is not in acute distress  Appearance: He is well-developed  He is not diaphoretic  Eyes:      Pupils: Pupils are equal, round, and reactive to light  Neck:      Thyroid: No thyromegaly  Vascular: No JVD  Trachea: No tracheal deviation     Cardiovascular: Rate and Rhythm: Normal rate and regular rhythm  Heart sounds: Normal heart sounds  No murmur heard  No friction rub  No gallop  Pulmonary:      Effort: Pulmonary effort is normal  No respiratory distress  Breath sounds: Normal breath sounds  No stridor  No wheezing or rales  Chest:      Chest wall: No tenderness  Abdominal:      General: Bowel sounds are normal  There is no distension  Palpations: Abdomen is soft  There is no mass  Tenderness: There is no abdominal tenderness  There is no guarding or rebound  Musculoskeletal:         General: Normal range of motion  Cervical back: Normal range of motion and neck supple  Lymphadenopathy:      Cervical: No cervical adenopathy  Skin:     General: Skin is warm and dry  Coloration: Skin is not pale  Findings: No erythema or rash  Neurological:      Mental Status: He is alert and oriented to person, place, and time  Cranial Nerves: No cranial nerve deficit  Motor: No abnormal muscle tone  Coordination: Coordination normal       Deep Tendon Reflexes: Reflexes are normal and symmetric   Reflexes normal          Appointment on 06/04/2021   Component Date Value    WBC 06/04/2021 11 51*    RBC 06/04/2021 5 62     Hemoglobin 06/04/2021 15 1     Hematocrit 06/04/2021 45 5     MCV 06/04/2021 81*    MCH 06/04/2021 26 9     MCHC 06/04/2021 33 2     RDW 06/04/2021 11 8     MPV 06/04/2021 9 3     Platelets 63/16/3288 339     nRBC 06/04/2021 0     Neutrophils Relative 06/04/2021 52     Immat GRANS % 06/04/2021 1     Lymphocytes Relative 06/04/2021 39     Monocytes Relative 06/04/2021 6     Eosinophils Relative 06/04/2021 2     Basophils Relative 06/04/2021 0     Neutrophils Absolute 06/04/2021 5 99     Immature Grans Absolute 06/04/2021 0 08     Lymphocytes Absolute 06/04/2021 4 49*    Monocytes Absolute 06/04/2021 0 67     Eosinophils Absolute 06/04/2021 0 23     Basophils Absolute 06/04/2021 0 05     TSH 3RD GENERATON 06/04/2021 3 150     Sodium 06/04/2021 136     Potassium 06/04/2021 3 7     Chloride 06/04/2021 99*    CO2 06/04/2021 28     ANION GAP 06/04/2021 9     BUN 06/04/2021 10     Creatinine 06/04/2021 0 70     Glucose, Fasting 06/04/2021 180*    Calcium 06/04/2021 9 4     AST 06/04/2021 13     ALT 06/04/2021 28     Alkaline Phosphatase 06/04/2021 48     Total Protein 06/04/2021 7 4     Albumin 06/04/2021 4 1     Total Bilirubin 06/04/2021 0 49     eGFR 06/04/2021 135     Hemoglobin A1C 06/04/2021 11 2*    EAG 06/04/2021 275     LDL Direct 06/04/2021 130*    Cholesterol 06/04/2021 208*    Triglycerides 06/04/2021 386*    HDL, Direct 06/04/2021 39*    LDL Calculated 06/04/2021 92     Non-HDL-Chol (CHOL-HDL) 06/04/2021 169     Creatinine, Ur 06/04/2021 223 0     Microalbum  ,U,Random 06/04/2021 18 9     Microalb Creat Ratio 06/04/2021 8    Office Visit on 05/18/2021   Component Date Value    Hemoglobin A1C 05/18/2021 12 2*

## 2021-06-14 NOTE — ASSESSMENT & PLAN NOTE
Lab Results   Component Value Date    HGBA1C 11 2 (H) 06/04/2021   The patient's hemoglobin A1c is high demonstrating poor control of his type 2 diabetes  We had a discussion that he really needs to work on improving his diet and exercise and needs to be consistent with taking his medication  We are going to increase the dose of the glimepiride as ordered to 4 mg daily  He will start checking his blood sugar regularly at home  We will see him back as scheduled he will get lab work done prior to that visit

## 2024-06-03 NOTE — PROGRESS NOTES
Assessment   1  Type 2 diabetes mellitus (250 00) (E11 9)   2  Essential hypertension (401 9) (I10)   3  Obesity (BMI 30-39 9) (278 00) (E66 9)    Plan   Type 2 diabetes mellitus    · (1) COMPREHENSIVE METABOLIC PANEL; Status:Active; Requested [id number];    · (1) HEMOGLOBIN A1C; Status:Active; Requested [id number];    · (1) LIPID PANEL FASTING W DIRECT LDL REFLEX; Status:Active; Requested    [id number];    · (1) MICROALBUMIN CREATININE RATIO, RANDOM URINE; Status:Active; Requested    [id number]:[id number];    · (1) TSH; Status:Active; Requested ARM:[id number];    · Follow-up visit in 3 months Evaluation and Treatment  Follow-up  Status: Complete     Done: [id number]    Discussion/Summary      Sathish Madrigal is doing very well on his current medications  We have made no changes today  He will go for the lab work as ordered  We will plan on seeing him back in the office again in 3 months or sooner as needed  Possible side effects of new medications were reviewed with the patient/guardian today  The treatment plan was reviewed with the patient/guardian  The patient/guardian understands and agrees with the treatment plan      Chief Complaint   follow up for diabetes type 2  Patient is here today for follow up of chronic conditions described in HPI  History of Present Illness   Carmie Dance is an 79-year-old male presents today for follow-up of his type 2 diabetes  He is referred back here by his endocrinologist to the fact that is now 24  He was just seen there 2 weeks ago  He is checking his BS at home- he is checking his 2 times a day  He is getting readings in the mid 100's  He is taking the Metformin  The patient denies any chest pain, shortness of breath, or palpitations  There is no edema  There are no headaches or visual changes  There is no lightheadedness, dizziness, or fainting spells  are no GI problems  does not need refills  The patient is being seen for a routine clinic follow-up of hypertension  Return for new or worsening symptoms such as fever, confusion, changes in vision.    Apply hydrocortisone ointment to the affected areas avoiding the eye   Symptoms:  no headache,-- no visual disturbance,-- no epistaxis,-- no palpitations,-- no rapid heartbeat,-- no flushing,-- no tremor,-- no dyspnea,-- no edema,-- no hematuria,-- no fatigue-- and-- no weight gain  The patient is being seen for a routine clinic follow-up of diabetes type II  He has no known diabetic complications  Symptoms:  no polyuria,-- no nocturia,-- no polydipsia,-- no polyphagia,-- no weight loss,-- no nausea,-- no vomiting,-- no diarrhea,-- no generalized weakness,-- no fatigue,-- no dysuria,-- no vaginal discharge,-- no visual disturbance,-- no foot pain-- and-- no foot tingling  Review of Systems        Constitutional: No complaints of tiredness, feels well, no fever, no chills, no recent weight gain or loss  Eyes: No complaints of eye pain, no discharge from eyes, no eyesight problems, eyes do not itch, no red or dry eyes  ENT: no complaints of nasal discharge, no earache, no loss of hearing, no hoarseness or sore throat, no nosebleeds  Cardiovascular: No complaints of chest pain, no palpitations, normal heart rate, no leg claudication or lower leg edema  Respiratory: No complaints of shortness of breath, no wheezing or cough, no dyspnea on exertion  Gastrointestinal: No complaints of abdominal pain, no nausea or vomiting, no constipation, no diarrhea or bloody stools  Genitourinary: No complaints of testicular pain, no dysuria or nocturia, no incontinence, no hesitancy, no gential lesion  Musculoskeletal: No complaints of joint stiffness or swelling, no myalgias, no limb pain or swelling  Integumentary: No complaints of skin rash, no skin lesions or wounds, no itching, no dry skin  Neurological: No complaints of headache, no numbness or tingling, no dizziness or fainting, no confusion, no convulsions, no limb weakness or difficulty walking        Psychiatric: No complaints of feeling depressed, no suicidal thoughts, no emotional problems, no anxiety, no sleep disturbances or changes in personality  Endocrine: No complaints of muscle weakness, no feelings of weakness, no erectile dysfunction, no deepening of voice, no hot flashes or proptosis  Hematologic/Lymphatic: No complaints of swollen glands, no neck swollen glands, does not bleed or bruise easily  ROS reported by the patient  Active Problems   1  Essential hypertension (401 9) (I10)   2  Obesity (BMI 30-39 9) (278 00) (E66 9)   3  Type 2 diabetes mellitus (250 00) (E11 9)    Past Medical History   1  History of Closed fracture of navicular bone of left foot, with routine healing, subsequent     encounter   2  History of Fracture of navicular bone of foot, left, closed, initial encounter   3  Need for meningococcal vaccination (V03 89) (Z23)   4  History of Viral upper respiratory illness (465 9) (J06 9,B97 89)     The active problems and past medical history were reviewed and updated today  Surgical History   1  Denied: History Of Prior Surgery     The surgical history was reviewed and updated today  Family History   Mother    1  Family history of Anemia  Maternal Grandmother    2  Family history of type 2 diabetes mellitus (V18 0) (Z83 3)  Paternal Grandmother    3  Family history of type 2 diabetes mellitus (V18 0) (Z83 3)     The family history was reviewed and updated today  Social History    · High school student   · Never a smoker  The social history was reviewed and updated today  The social history was reviewed and is unchanged  Current Meds    1  Accu-Chek FastClix Lancets Miscellaneous; Check blood sugar 5 times per day  May     substitute with FreeStyle or OneTouch or Puma only; Therapy: [id number] to (Evaluate:19Mar2018)  Requested for: 22IUT9202; Last     Rx:20Sep2017 Ordered   2  Accu-Chek SmartView In Vitro Strip; TEST 5 TIMES DAILY  May substitute with OneTouch     or FreeStyle or Puma device only;      Therapy: [id number] to (Evaluate:19Mar2018)  Requested for: [id number]; Last     Rx:20Sep2017 Ordered   3  Lisinopril 10 MG Oral Tablet; TAKE 1 TABLET DAILY; Therapy: [id number] to (Evaluate:12Dec2017)  Requested for: 47WJM9548; Last     Rx:17Mar2017 Ordered   4  MetFORMIN HCl  MG Oral Tablet Extended Release 24 Hour; 3 tabs daily as     directed by physician; Therapy: 91MYQ4498 to (Evaluate:17Jun2018)  Requested for: 83MVH4612; Last     Rx:20Sep2017 Ordered     The medication list was reviewed and updated today  Allergies   1  No Known Drug Allergies    Vitals   Vital Signs    Recorded: 28Dec2017 11:02AM Recorded: 28Dec2017 10:26AM   Heart Rate 68 82   Respiration  16   Systolic 893 995, LUE, Sitting   Diastolic 78 78, LUE, Sitting   BP CUFF SIZE  Large   Height  6 ft 0 17 in   Weight  249 lb    BMI Calculated  33 61   BSA Calculated  2 34   BMI Percentile  99 %   2-20 Stature Percentile  84 %   2-20 Weight Percentile  99 %     Physical Exam        Constitutional - General appearance: No acute distress, well appearing and well nourished  Eyes - Conjunctiva and lids: No injection, edema or discharge  -- Pupils and irises: Equal, round, reactive to light bilaterally  Ears, Nose, Mouth, and Throat - External inspection of ears and nose: Normal without deformities or discharge  -- Otoscopic examination: Tympanic membranes gray, translucent with good bony landmarks and light reflex  Canals patent without erythema  -- Nasal mucosa, septum, and turbinates: Normal, no edema or discharge  -- Oropharynx: Moist mucosa, normal tongue and tonsils without lesions  Neck - Neck: Supple, symmetric, no masses  Pulmonary - Respiratory effort: Normal respiratory rate and rhythm, no increased work of breathing -- Auscultation of lungs: Clear bilaterally  Cardiovascular - Auscultation of heart: Regular rate and rhythm, normal S1 and S2, no murmur -- Pedal pulses: Normal, 2+ bilaterally  -- Examination of extremities for edema and/or varicosities: Normal       Abdomen - Abdomen: Normal bowel sounds, soft, non-tender, no masses  -- Liver and spleen: No hepatomegaly or splenomegaly  Lymphatic - Palpation of lymph nodes in neck: No anterior or posterior cervical lymphadenopathy  Musculoskeletal - Gait and station: Normal gait  -- Digits and nails: Normal without clubbing or cyanosis  -- Inspection/palpation of joints, bones, and muscles: Normal       Skin - Skin and subcutaneous tissue: Normal       Neurologic - Cranial nerves: Normal -- Reflexes: Normal -- Sensation: Normal       Psychiatric - Orientation to person, place, and time: Normal -- Mood and affect: Normal       Signatures    Electronically signed by : Chloé Mcmillan DO; Dec 28 2017 11:10AM EST                       (Author)